# Patient Record
Sex: MALE | Race: WHITE | NOT HISPANIC OR LATINO | Employment: OTHER | URBAN - METROPOLITAN AREA
[De-identification: names, ages, dates, MRNs, and addresses within clinical notes are randomized per-mention and may not be internally consistent; named-entity substitution may affect disease eponyms.]

---

## 2017-06-16 ENCOUNTER — HISTORICAL (OUTPATIENT)
Dept: LAB | Facility: HOSPITAL | Age: 67
End: 2017-06-16

## 2017-06-16 LAB
ABS NEUT (OLG): 4.62
ALBUMIN SERPL-MCNC: 3.2 GM/DL (ref 3.4–5)
ALBUMIN/GLOB SERPL: 0.9 RATIO (ref 1.1–2)
ALP SERPL-CCNC: 79 UNIT/L (ref 50–136)
ALT SERPL-CCNC: 41 UNIT/L (ref 12–78)
APTT PPP: 24.8 SECOND(S) (ref 23–32)
AST SERPL-CCNC: 24 UNIT/L (ref 10–37)
BASOPHILS # BLD AUTO: 0.04 X10(3)/MCL
BASOPHILS NFR BLD AUTO: 0.5 %
BILIRUB SERPL-MCNC: 0.6 MG/DL (ref 0.2–1)
BILIRUBIN DIRECT+TOT PNL SERPL-MCNC: 0.15 MG/DL (ref 0.05–0.2)
BILIRUBIN DIRECT+TOT PNL SERPL-MCNC: 0.45 MG/DL
BUN SERPL-MCNC: 19 MG/DL (ref 7–18)
CALCIUM SERPL-MCNC: 9 MG/DL (ref 8.5–10.1)
CHLORIDE SERPL-SCNC: 107 MMOL/L (ref 98–107)
CHOLEST SERPL-MCNC: 174 MG/DL (ref 50–200)
CHOLEST/HDLC SERPL: 3 {RATIO} (ref 0–5)
CO2 SERPL-SCNC: 26.6 MMOL/L (ref 21–32)
CREAT SERPL-MCNC: 1.02 MG/DL (ref 0.7–1.3)
EOSINOPHIL # BLD AUTO: 0.18 X10(3)/MCL
EOSINOPHIL NFR BLD AUTO: 2.3 %
ERYTHROCYTE [DISTWIDTH] IN BLOOD BY AUTOMATED COUNT: 14 %
GLOBULIN SER-MCNC: 3.5 GM/DL (ref 2.4–3.5)
GLUCOSE SERPL-MCNC: 106 MG/DL (ref 74–106)
HCT VFR BLD AUTO: 44.5 % (ref 39–49)
HDLC SERPL-MCNC: 51 MG/DL (ref 35–60)
HGB BLD-MCNC: 15 GM/DL (ref 12.6–16.6)
IMM GRANULOCYTES # BLD AUTO: 0.01 10*3/UL (ref 0–0.1)
IMM GRANULOCYTES NFR BLD AUTO: 0.1 % (ref 0–1)
INR PPP: 1.04
LDLC SERPL CALC-MCNC: 98 MG/DL (ref 50–140)
LYMPHOCYTES # BLD AUTO: 2.1 X10(3)/MCL
LYMPHOCYTES NFR BLD AUTO: 26.9 %
MCH RBC QN AUTO: 32.5 PG (ref 27–33)
MCHC RBC AUTO-ENTMCNC: 33.7 GM/DL (ref 32–35)
MCV RBC AUTO: 96.3 FL (ref 84–97)
MONOCYTES # BLD AUTO: 0.87 X10(3)/MCL
MONOCYTES NFR BLD AUTO: 11.1 %
NEUTROPHILS # BLD AUTO: 4.62 X10(3)/MCL
NEUTROPHILS NFR BLD AUTO: 59.1 %
PLATELET # BLD AUTO: 224 X10(3)/MCL (ref 151–368)
PMV BLD AUTO: 10 FL
POTASSIUM SERPL-SCNC: 4.4 MMOL/L (ref 3.5–5.1)
PROT SERPL-MCNC: 6.7 GM/DL (ref 6.4–8.2)
PROTHROMBIN TIME: 10.5 SECOND(S) (ref 9–11.5)
PSA SERPL-MCNC: 0.6 NG/ML (ref 0–4)
RBC # BLD AUTO: 4.62 X10(6)/MCL (ref 4.3–5.6)
SODIUM SERPL-SCNC: 141 MMOL/L (ref 136–145)
TRIGL SERPL-MCNC: 127 MG/DL (ref 30–150)
VLDLC SERPL CALC-MCNC: 25 MG/DL
WBC # SPEC AUTO: 7.82 X10(3)/MCL (ref 3.4–9.2)

## 2017-07-20 ENCOUNTER — HISTORICAL (OUTPATIENT)
Dept: LAB | Facility: HOSPITAL | Age: 67
End: 2017-07-20

## 2017-07-20 LAB
ABS NEUT (OLG): 4.84
APPEARANCE, UA: CLEAR
APTT PPP: 25.4 SECOND(S) (ref 23–32)
BACTERIA #/AREA URNS AUTO: NORMAL /HPF
BASOPHILS # BLD AUTO: 0.03 X10(3)/MCL
BASOPHILS NFR BLD AUTO: 0.4 %
BILIRUB UR QL STRIP: NEGATIVE
BUN SERPL-MCNC: 16 MG/DL (ref 7–18)
CALCIUM SERPL-MCNC: 9.3 MG/DL (ref 8.5–10.1)
CHLORIDE SERPL-SCNC: 106 MMOL/L (ref 98–107)
CO2 SERPL-SCNC: 26.8 MMOL/L (ref 21–32)
COLOR UR: YELLOW
CREAT SERPL-MCNC: 0.96 MG/DL (ref 0.7–1.3)
EOSINOPHIL # BLD AUTO: 0.19 X10(3)/MCL
EOSINOPHIL NFR BLD AUTO: 2.5 %
ERYTHROCYTE [DISTWIDTH] IN BLOOD BY AUTOMATED COUNT: 13 %
GLUCOSE (UA): NEGATIVE
GLUCOSE SERPL-MCNC: 104 MG/DL (ref 74–106)
HCT VFR BLD AUTO: 43.4 % (ref 39–49)
HGB BLD-MCNC: 14.9 GM/DL (ref 12.6–16.6)
HGB UR QL STRIP: NEGATIVE
IMM GRANULOCYTES # BLD AUTO: 0.01 10*3/UL (ref 0–0.1)
IMM GRANULOCYTES NFR BLD AUTO: 0.1 % (ref 0–1)
INR PPP: 1.04
KETONES UR QL STRIP: NEGATIVE
LEUKOCYTE ESTERASE UR QL STRIP: NEGATIVE
LYMPHOCYTES # BLD AUTO: 1.97 X10(3)/MCL
LYMPHOCYTES NFR BLD AUTO: 25.6 %
MCH RBC QN AUTO: 32.7 PG (ref 27–33)
MCHC RBC AUTO-ENTMCNC: 34.3 GM/DL (ref 32–35)
MCV RBC AUTO: 95.4 FL (ref 84–97)
MONOCYTES # BLD AUTO: 0.65 X10(3)/MCL
MONOCYTES NFR BLD AUTO: 8.5 %
NEUTROPHILS # BLD AUTO: 4.84 X10(3)/MCL
NEUTROPHILS NFR BLD AUTO: 62.9 %
NITRITE UR QL STRIP.AUTO: NEGATIVE
PH UR STRIP: 6 [PH] (ref 4.6–8)
PLATELET # BLD AUTO: 218 X10(3)/MCL (ref 151–368)
PMV BLD AUTO: 10 FL
POTASSIUM SERPL-SCNC: 3.8 MMOL/L (ref 3.5–5.1)
PROT UR QL STRIP: NEGATIVE
PROTHROMBIN TIME: 10.5 SECOND(S) (ref 9–11.5)
RBC # BLD AUTO: 4.55 X10(6)/MCL (ref 4.3–5.6)
RBC #/AREA URNS HPF: NORMAL /[HPF]
SODIUM SERPL-SCNC: 143 MMOL/L (ref 136–145)
SP GR UR STRIP: 1.02 (ref 1–1.03)
SQUAMOUS EPITHELIAL, UA: NORMAL
UROBILINOGEN UR STRIP-ACNC: 0.2
WBC # SPEC AUTO: 7.69 X10(3)/MCL (ref 3.4–9.2)
WBC #/AREA URNS AUTO: NORMAL /[HPF]

## 2017-09-07 LAB
BUN SERPL-MCNC: 11 MG/DL (ref 7–18)
CALCIUM SERPL-MCNC: 9.2 MG/DL (ref 9–10.9)
CHLORIDE SERPL-SCNC: 104 MMOL/L (ref 98–116)
CO2 SERPL-SCNC: 29 MMOL/L (ref 15–28)
CREAT SERPL-MCNC: 81 MG/DL (ref 0.6–1.3)
GLUCOSE SERPL-MCNC: 85 MG/DL (ref 74–106)
POTASSIUM SERPL-SCNC: 4.6 MMOL/L (ref 3.5–5.1)
SODIUM SERPL-SCNC: 142 MEQ/L (ref 131–145)

## 2018-06-14 ENCOUNTER — HISTORICAL (OUTPATIENT)
Dept: LAB | Facility: HOSPITAL | Age: 68
End: 2018-06-14

## 2018-06-14 LAB
ABS NEUT (OLG): 4.16
ALBUMIN SERPL-MCNC: 3.2 GM/DL (ref 3.4–5)
ALBUMIN/GLOB SERPL: 0.9 RATIO (ref 1.1–2)
ALP SERPL-CCNC: 104 UNIT/L (ref 46–116)
ALT SERPL-CCNC: 49 UNIT/L (ref 12–78)
AST SERPL-CCNC: 63 UNIT/L (ref 10–37)
BASOPHILS # BLD AUTO: 0.02 X10(3)/MCL
BASOPHILS NFR BLD AUTO: 0.2 %
BILIRUB SERPL-MCNC: 0.5 MG/DL (ref 0.2–1)
BILIRUBIN DIRECT+TOT PNL SERPL-MCNC: 0.11 MG/DL (ref 0–0.2)
BILIRUBIN DIRECT+TOT PNL SERPL-MCNC: 0.39 MG/DL
BUN SERPL-MCNC: 17 MG/DL (ref 7–18)
CALCIUM SERPL-MCNC: 9.1 MG/DL (ref 8.5–10.1)
CHLORIDE SERPL-SCNC: 105 MMOL/L (ref 98–107)
CK SERPL-CCNC: 1445 MG/DL (ref 26–308)
CO2 SERPL-SCNC: 26.8 MMOL/L (ref 21–32)
CREAT SERPL-MCNC: 0.97 MG/DL (ref 0.7–1.3)
EOSINOPHIL # BLD AUTO: 0.97 X10(3)/MCL
EOSINOPHIL NFR BLD AUTO: 11.6 %
ERYTHROCYTE [DISTWIDTH] IN BLOOD BY AUTOMATED COUNT: 14 %
GLOBULIN SER-MCNC: 3.6 GM/DL (ref 2.4–3.5)
GLUCOSE SERPL-MCNC: 100 MG/DL (ref 74–106)
HCT VFR BLD AUTO: 43 % (ref 39–49)
HGB BLD-MCNC: 14.8 GM/DL (ref 12.6–16.6)
IMM GRANULOCYTES # BLD AUTO: 0.01 10*3/UL (ref 0–0.1)
IMM GRANULOCYTES NFR BLD AUTO: 0.1 % (ref 0–1)
LYMPHOCYTES # BLD AUTO: 2.46 X10(3)/MCL
LYMPHOCYTES NFR BLD AUTO: 29.4 %
MCH RBC QN AUTO: 33.3 PG (ref 27–33)
MCHC RBC AUTO-ENTMCNC: 34.4 GM/DL (ref 32–35)
MCV RBC AUTO: 96.8 FL (ref 84–97)
MONOCYTES # BLD AUTO: 0.75 X10(3)/MCL
MONOCYTES NFR BLD AUTO: 9 %
NEUTROPHILS # BLD AUTO: 4.16 X10(3)/MCL
NEUTROPHILS NFR BLD AUTO: 49.7 %
PLATELET # BLD AUTO: 220 X10(3)/MCL (ref 151–368)
PMV BLD AUTO: 9 FL
POTASSIUM SERPL-SCNC: 4 MMOL/L (ref 3.5–5.1)
PROT SERPL-MCNC: 6.8 GM/DL (ref 6.4–8.2)
RBC # BLD AUTO: 4.44 X10(6)/MCL (ref 4.3–5.6)
SODIUM SERPL-SCNC: 141 MMOL/L (ref 136–145)
WBC # SPEC AUTO: 8.37 X10(3)/MCL (ref 3.4–9.2)

## 2018-06-19 ENCOUNTER — HISTORICAL (OUTPATIENT)
Dept: LAB | Facility: HOSPITAL | Age: 68
End: 2018-06-19

## 2018-06-19 LAB
ALBUMIN SERPL-MCNC: 3.3 GM/DL (ref 3.4–5)
ALBUMIN/GLOB SERPL: 0.9 RATIO (ref 1.1–2)
ALP SERPL-CCNC: 100 UNIT/L (ref 46–116)
ALT SERPL-CCNC: 38 UNIT/L (ref 12–78)
AST SERPL-CCNC: 19 UNIT/L (ref 10–37)
BILIRUB SERPL-MCNC: 0.4 MG/DL (ref 0.2–1)
BILIRUBIN DIRECT+TOT PNL SERPL-MCNC: 0.1 MG/DL (ref 0–0.2)
BILIRUBIN DIRECT+TOT PNL SERPL-MCNC: 0.3 MG/DL
BUN SERPL-MCNC: 22 MG/DL (ref 7–18)
CALCIUM SERPL-MCNC: 9 MG/DL (ref 8.5–10.1)
CHLORIDE SERPL-SCNC: 106 MMOL/L (ref 98–107)
CK SERPL-CCNC: 118 MG/DL (ref 26–308)
CO2 SERPL-SCNC: 28.7 MMOL/L (ref 21–32)
CREAT SERPL-MCNC: 1.18 MG/DL (ref 0.7–1.3)
GLOBULIN SER-MCNC: 3.7 GM/DL (ref 2.4–3.5)
GLUCOSE SERPL-MCNC: 101 MG/DL (ref 74–106)
POTASSIUM SERPL-SCNC: 4.1 MMOL/L (ref 3.5–5.1)
PROT SERPL-MCNC: 7 GM/DL (ref 6.4–8.2)
SODIUM SERPL-SCNC: 139 MMOL/L (ref 136–145)

## 2019-04-17 ENCOUNTER — HISTORICAL (OUTPATIENT)
Dept: LAB | Facility: HOSPITAL | Age: 69
End: 2019-04-17

## 2019-04-17 LAB
ABS NEUT (OLG): 5.13
ALBUMIN SERPL-MCNC: 3.4 GM/DL (ref 3.4–5)
ALBUMIN/GLOB SERPL: 1 RATIO (ref 1.1–2)
ALP SERPL-CCNC: 96 UNIT/L (ref 46–116)
ALT SERPL-CCNC: 42 UNIT/L (ref 12–78)
AST SERPL-CCNC: 30 UNIT/L (ref 10–37)
BASOPHILS # BLD AUTO: 0.04 X10(3)/MCL
BASOPHILS NFR BLD AUTO: 0.5 %
BILIRUB SERPL-MCNC: 0.6 MG/DL (ref 0.2–1)
BILIRUBIN DIRECT+TOT PNL SERPL-MCNC: 0.15 MG/DL (ref 0–0.2)
BILIRUBIN DIRECT+TOT PNL SERPL-MCNC: 0.45 MG/DL
BUN SERPL-MCNC: 11 MG/DL (ref 7–18)
CALCIUM SERPL-MCNC: 8.9 MG/DL (ref 8.5–10.1)
CHLORIDE SERPL-SCNC: 103 MMOL/L (ref 98–107)
CHOLEST SERPL-MCNC: 154 MG/DL (ref 50–200)
CHOLEST/HDLC SERPL: 4 {RATIO} (ref 0–5)
CO2 SERPL-SCNC: 26 MMOL/L (ref 21–32)
CREAT SERPL-MCNC: 0.95 MG/DL (ref 0.7–1.3)
EOSINOPHIL # BLD AUTO: 0.16 X10(3)/MCL
EOSINOPHIL NFR BLD AUTO: 2 %
ERYTHROCYTE [DISTWIDTH] IN BLOOD BY AUTOMATED COUNT: 14 %
GLOBULIN SER-MCNC: 3.5 GM/DL (ref 2.4–3.5)
GLUCOSE SERPL-MCNC: 117 MG/DL (ref 74–106)
HCT VFR BLD AUTO: 44.8 % (ref 39–49)
HDLC SERPL-MCNC: 42 MG/DL (ref 35–60)
HGB BLD-MCNC: 14.8 GM/DL (ref 12.6–16.6)
IMM GRANULOCYTES # BLD AUTO: 0.01 10*3/UL (ref 0–0.1)
IMM GRANULOCYTES NFR BLD AUTO: 0.1 % (ref 0–1)
LDLC SERPL CALC-MCNC: 90 MG/DL (ref 50–140)
LYMPHOCYTES # BLD AUTO: 2.06 X10(3)/MCL
LYMPHOCYTES NFR BLD AUTO: 25.3 %
MCH RBC QN AUTO: 32.5 PG (ref 27–33)
MCHC RBC AUTO-ENTMCNC: 33 GM/DL (ref 32–35)
MCV RBC AUTO: 98.5 FL (ref 84–97)
MONOCYTES # BLD AUTO: 0.75 X10(3)/MCL
MONOCYTES NFR BLD AUTO: 9.2 %
NEUTROPHILS # BLD AUTO: 5.13 X10(3)/MCL
NEUTROPHILS NFR BLD AUTO: 62.9 %
PLATELET # BLD AUTO: 237 X10(3)/MCL (ref 151–368)
PMV BLD AUTO: 9 FL
POTASSIUM SERPL-SCNC: 3.9 MMOL/L (ref 3.5–5.1)
PROT SERPL-MCNC: 6.9 GM/DL (ref 6.4–8.2)
PSA SERPL-MCNC: 1.37 NG/ML (ref 0–4)
RBC # BLD AUTO: 4.55 X10(6)/MCL (ref 4.3–5.6)
SODIUM SERPL-SCNC: 137 MMOL/L (ref 136–145)
TRIGL SERPL-MCNC: 112 MG/DL (ref 30–150)
VLDLC SERPL CALC-MCNC: 22 MG/DL
WBC # SPEC AUTO: 8.15 X10(3)/MCL (ref 3.4–9.2)

## 2019-06-05 ENCOUNTER — HISTORICAL (OUTPATIENT)
Dept: LAB | Facility: HOSPITAL | Age: 69
End: 2019-06-05

## 2019-06-05 LAB
ABS NEUT (OLG): 4.47
ALBUMIN SERPL-MCNC: 3.5 GM/DL (ref 3.4–5)
ALBUMIN/GLOB SERPL: 0.9 RATIO (ref 1.1–2)
ALP SERPL-CCNC: 101 UNIT/L (ref 46–116)
ALT SERPL-CCNC: 58 UNIT/L (ref 12–78)
AST SERPL-CCNC: 21 UNIT/L (ref 10–37)
BASOPHILS # BLD AUTO: 0.04 X10(3)/MCL
BASOPHILS NFR BLD AUTO: 0.5 %
BILIRUB SERPL-MCNC: 0.4 MG/DL (ref 0.2–1)
BILIRUBIN DIRECT+TOT PNL SERPL-MCNC: 0.1 MG/DL (ref 0–0.2)
BILIRUBIN DIRECT+TOT PNL SERPL-MCNC: 0.3 MG/DL
BUN SERPL-MCNC: 14 MG/DL (ref 7–18)
CALCIUM SERPL-MCNC: 9.1 MG/DL (ref 8.5–10.1)
CHLORIDE SERPL-SCNC: 108 MMOL/L (ref 98–107)
CK SERPL-CCNC: 104 MG/DL (ref 26–308)
CO2 SERPL-SCNC: 27.3 MMOL/L (ref 21–32)
CREAT SERPL-MCNC: 1.02 MG/DL (ref 0.7–1.3)
EOSINOPHIL # BLD AUTO: 0.11 X10(3)/MCL
EOSINOPHIL NFR BLD AUTO: 1.3 %
ERYTHROCYTE [DISTWIDTH] IN BLOOD BY AUTOMATED COUNT: 14 %
GLOBULIN SER-MCNC: 3.7 GM/DL (ref 2.4–3.5)
GLUCOSE SERPL-MCNC: 97 MG/DL (ref 74–106)
HCT VFR BLD AUTO: 44.8 % (ref 39–49)
HGB BLD-MCNC: 15.1 GM/DL (ref 12.6–16.6)
IMM GRANULOCYTES # BLD AUTO: 0.03 10*3/UL (ref 0–0.1)
IMM GRANULOCYTES NFR BLD AUTO: 0.4 % (ref 0–1)
LYMPHOCYTES # BLD AUTO: 2.68 X10(3)/MCL
LYMPHOCYTES NFR BLD AUTO: 32.8 %
MCH RBC QN AUTO: 32.7 PG (ref 27–33)
MCHC RBC AUTO-ENTMCNC: 33.7 GM/DL (ref 32–35)
MCV RBC AUTO: 97 FL (ref 84–97)
MONOCYTES # BLD AUTO: 0.84 X10(3)/MCL
MONOCYTES NFR BLD AUTO: 10.3 %
NEUTROPHILS # BLD AUTO: 4.47 X10(3)/MCL
NEUTROPHILS NFR BLD AUTO: 54.7 %
PLATELET # BLD AUTO: 253 X10(3)/MCL (ref 151–368)
PMV BLD AUTO: 9 FL
POTASSIUM SERPL-SCNC: 4 MMOL/L (ref 3.5–5.1)
PROT SERPL-MCNC: 7.2 GM/DL (ref 6.4–8.2)
RBC # BLD AUTO: 4.62 X10(6)/MCL (ref 4.3–5.6)
SODIUM SERPL-SCNC: 144 MMOL/L (ref 136–145)
WBC # SPEC AUTO: 8.17 X10(3)/MCL (ref 3.4–9.2)

## 2020-02-07 ENCOUNTER — HISTORICAL (OUTPATIENT)
Dept: LAB | Facility: HOSPITAL | Age: 70
End: 2020-02-07

## 2020-02-07 LAB
ABS NEUT (OLG): 4.04
ALBUMIN SERPL-MCNC: 3.6 GM/DL (ref 3.2–4.6)
ALBUMIN/GLOB SERPL: 1.1 RATIO (ref 1.1–2)
ALP SERPL-CCNC: 93 UNIT/L (ref 40–150)
ALT SERPL-CCNC: 31 UNIT/L (ref 0–55)
AST SERPL-CCNC: 20 UNIT/L (ref 5–34)
BASOPHILS # BLD AUTO: 0.04 X10(3)/MCL
BASOPHILS NFR BLD AUTO: 0.6 %
BILIRUB SERPL-MCNC: 0.5 MG/DL
BILIRUBIN DIRECT+TOT PNL SERPL-MCNC: 0.2 MG/DL (ref 0–0.5)
BILIRUBIN DIRECT+TOT PNL SERPL-MCNC: 0.3 MG/DL
BUN SERPL-MCNC: 16 MG/DL (ref 8.4–25.7)
CALCIUM SERPL-MCNC: 9.5 MG/DL (ref 8.8–10)
CHLORIDE SERPL-SCNC: 108 MMOL/L (ref 98–107)
CK SERPL-CCNC: 119 U/L (ref 30–200)
CO2 SERPL-SCNC: 25 MEQ/L (ref 23–31)
CREAT SERPL-MCNC: 1.05 MG/DL (ref 0.73–1.18)
EOSINOPHIL # BLD AUTO: 0.22 X10(3)/MCL
EOSINOPHIL NFR BLD AUTO: 3.1 %
ERYTHROCYTE [DISTWIDTH] IN BLOOD BY AUTOMATED COUNT: 14 %
GLOBULIN SER-MCNC: 3.3 GM/DL (ref 2.4–3.5)
GLUCOSE SERPL-MCNC: 124 MG/DL (ref 82–115)
HCT VFR BLD AUTO: 46.9 % (ref 39–49)
HGB BLD-MCNC: 15.1 GM/DL (ref 12.6–16.6)
IMM GRANULOCYTES # BLD AUTO: 0.01 10*3/UL (ref 0–0.1)
IMM GRANULOCYTES NFR BLD AUTO: 0.1 % (ref 0–1)
LYMPHOCYTES # BLD AUTO: 2.01 X10(3)/MCL
LYMPHOCYTES NFR BLD AUTO: 28.6 %
MCH RBC QN AUTO: 32.1 PG (ref 27–33)
MCHC RBC AUTO-ENTMCNC: 32.2 GM/DL (ref 32–35)
MCV RBC AUTO: 99.8 FL (ref 84–97)
MONOCYTES # BLD AUTO: 0.71 X10(3)/MCL
MONOCYTES NFR BLD AUTO: 10.1 %
NEUTROPHILS # BLD AUTO: 4.04 X10(3)/MCL
NEUTROPHILS NFR BLD AUTO: 57.5 %
PLATELET # BLD AUTO: 277 X10(3)/MCL (ref 140–450)
PMV BLD AUTO: 10 FL
POTASSIUM SERPL-SCNC: 3.9 MMOL/L (ref 3.5–5.1)
PROT SERPL-MCNC: 6.9 GM/DL (ref 5.8–7.6)
RBC # BLD AUTO: 4.7 X10(6)/MCL (ref 4.3–5.6)
SODIUM SERPL-SCNC: 143 MMOL/L (ref 136–145)
WBC # SPEC AUTO: 7.03 X10(3)/MCL (ref 3.4–9.2)

## 2020-07-01 ENCOUNTER — HISTORICAL (OUTPATIENT)
Dept: RADIOLOGY | Facility: HOSPITAL | Age: 70
End: 2020-07-01

## 2020-07-01 ENCOUNTER — HISTORICAL (OUTPATIENT)
Dept: ADMINISTRATIVE | Facility: HOSPITAL | Age: 70
End: 2020-07-01

## 2020-07-01 LAB — PSA SERPL-MCNC: 0.58 NG/ML

## 2020-07-21 ENCOUNTER — HISTORICAL (OUTPATIENT)
Dept: LAB | Facility: HOSPITAL | Age: 70
End: 2020-07-21

## 2020-07-21 LAB
ABS NEUT (OLG): 4.88
ALBUMIN SERPL-MCNC: 3.7 GM/DL (ref 3.4–4.8)
ALBUMIN/GLOB SERPL: 1 RATIO (ref 1.1–2)
ALP SERPL-CCNC: 104 UNIT/L (ref 40–150)
ALT SERPL-CCNC: 29 UNIT/L (ref 0–55)
AST SERPL-CCNC: 24 UNIT/L (ref 5–34)
BASOPHILS # BLD AUTO: 0.02 X10(3)/MCL
BASOPHILS NFR BLD AUTO: 0.3 %
BILIRUB SERPL-MCNC: 0.4 MG/DL
BILIRUBIN DIRECT+TOT PNL SERPL-MCNC: 0.1 MG/DL (ref 0–0.5)
BILIRUBIN DIRECT+TOT PNL SERPL-MCNC: 0.3 MG/DL
BUN SERPL-MCNC: 17 MG/DL (ref 8.4–25.7)
CALCIUM SERPL-MCNC: 9.4 MG/DL (ref 8.8–10)
CHLORIDE SERPL-SCNC: 109 MMOL/L (ref 98–107)
CK SERPL-CCNC: 154 U/L (ref 30–200)
CO2 SERPL-SCNC: 23 MEQ/L (ref 23–31)
CREAT SERPL-MCNC: 0.96 MG/DL (ref 0.73–1.18)
CRP SERPL-MCNC: <0.3 MG/DL
EOSINOPHIL # BLD AUTO: 0.04 X10(3)/MCL
EOSINOPHIL NFR BLD AUTO: 0.6 %
ERYTHROCYTE [DISTWIDTH] IN BLOOD BY AUTOMATED COUNT: 13 %
ERYTHROCYTE [SEDIMENTATION RATE] IN BLOOD: 22 MM/HR (ref 0–20)
GLOBULIN SER-MCNC: 3.7 GM/DL (ref 2.4–3.5)
GLUCOSE SERPL-MCNC: 155 MG/DL (ref 82–115)
HCT VFR BLD AUTO: 41.7 % (ref 39–49)
HGB BLD-MCNC: 14.6 GM/DL (ref 12.6–16.6)
IMM GRANULOCYTES # BLD AUTO: 0.1 10*3/UL (ref 0–0.1)
IMM GRANULOCYTES NFR BLD AUTO: 1.4 % (ref 0–1)
LYMPHOCYTES # BLD AUTO: 1.64 X10(3)/MCL
LYMPHOCYTES NFR BLD AUTO: 22.7 %
MCH RBC QN AUTO: 34.3 PG (ref 27–33)
MCHC RBC AUTO-ENTMCNC: 35 GM/DL (ref 32–35)
MCV RBC AUTO: 97.9 FL (ref 84–97)
MONOCYTES # BLD AUTO: 0.54 X10(3)/MCL
MONOCYTES NFR BLD AUTO: 7.5 %
NEUTROPHILS # BLD AUTO: 4.88 X10(3)/MCL
NEUTROPHILS NFR BLD AUTO: 67.5 %
PLATELET # BLD AUTO: 240 X10(3)/MCL (ref 140–450)
PMV BLD AUTO: 10 FL
POTASSIUM SERPL-SCNC: 4.5 MMOL/L (ref 3.5–5.1)
PROT SERPL-MCNC: 7.4 GM/DL (ref 5.8–7.6)
RBC # BLD AUTO: 4.26 X10(6)/MCL (ref 4.3–5.6)
SODIUM SERPL-SCNC: 143 MMOL/L (ref 136–145)
WBC # SPEC AUTO: 7.22 X10(3)/MCL (ref 3.4–9.2)

## 2020-08-28 ENCOUNTER — HISTORICAL (OUTPATIENT)
Dept: LAB | Facility: HOSPITAL | Age: 70
End: 2020-08-28

## 2020-08-28 LAB
CHOLEST SERPL-MCNC: 142 MG/DL
CHOLEST/HDLC SERPL: 4 {RATIO} (ref 0–5)
HDLC SERPL-MCNC: 38 MG/DL (ref 35–60)
LDLC SERPL CALC-MCNC: 81 MG/DL (ref 50–140)
PSA SERPL-MCNC: 0.41 NG/ML
TRIGL SERPL-MCNC: 115 MG/DL (ref 34–140)
VLDLC SERPL CALC-MCNC: 23 MG/DL

## 2021-03-17 ENCOUNTER — HISTORICAL (OUTPATIENT)
Dept: RADIOLOGY | Facility: HOSPITAL | Age: 71
End: 2021-03-17

## 2021-06-10 ENCOUNTER — HISTORICAL (OUTPATIENT)
Dept: RADIOLOGY | Facility: HOSPITAL | Age: 71
End: 2021-06-10

## 2021-09-23 ENCOUNTER — HISTORICAL (OUTPATIENT)
Dept: LAB | Facility: HOSPITAL | Age: 71
End: 2021-09-23

## 2021-09-23 LAB
ABS NEUT (OLG): 3.37
ALBUMIN SERPL-MCNC: 3.6 GM/DL (ref 3.4–4.8)
ALBUMIN/GLOB SERPL: 1 RATIO (ref 1.1–2)
ALP SERPL-CCNC: 193 UNIT/L (ref 40–150)
ALT SERPL-CCNC: 644 UNIT/L (ref 0–55)
AST SERPL-CCNC: 468 UNIT/L (ref 5–34)
BASOPHILS # BLD AUTO: 0.03 X10(3)/MCL
BASOPHILS NFR BLD AUTO: 0.5 %
BILIRUB SERPL-MCNC: 2.9 MG/DL
BILIRUBIN DIRECT+TOT PNL SERPL-MCNC: 0.7 MG/DL
BILIRUBIN DIRECT+TOT PNL SERPL-MCNC: 2.2 MG/DL (ref 0–0.5)
BUN SERPL-MCNC: 10 MG/DL (ref 8.4–25.7)
CALCIUM SERPL-MCNC: 9.7 MG/DL (ref 8.8–10)
CHLORIDE SERPL-SCNC: 107 MMOL/L (ref 98–107)
CHOLEST SERPL-MCNC: 153 MG/DL
CHOLEST/HDLC SERPL: 5 {RATIO} (ref 0–5)
CO2 SERPL-SCNC: 25 MEQ/L (ref 23–31)
CREAT SERPL-MCNC: 0.88 MG/DL (ref 0.73–1.18)
EOSINOPHIL # BLD AUTO: 0.18 X10(3)/MCL
EOSINOPHIL NFR BLD AUTO: 3.3 %
ERYTHROCYTE [DISTWIDTH] IN BLOOD BY AUTOMATED COUNT: 14 %
GLOBULIN SER-MCNC: 3.5 GM/DL (ref 2.4–3.5)
GLUCOSE SERPL-MCNC: 154 MG/DL (ref 82–115)
HCT VFR BLD AUTO: 47.3 % (ref 39–49)
HDLC SERPL-MCNC: 33 MG/DL (ref 35–60)
HGB BLD-MCNC: 15.8 GM/DL (ref 12.6–16.6)
IMM GRANULOCYTES # BLD AUTO: 0.01 10*3/UL (ref 0–0.1)
IMM GRANULOCYTES NFR BLD AUTO: 0.2 % (ref 0–1)
LDLC SERPL CALC-MCNC: 75 MG/DL (ref 50–140)
LYMPHOCYTES # BLD AUTO: 1.33 X10(3)/MCL
LYMPHOCYTES NFR BLD AUTO: 24.1 %
MCH RBC QN AUTO: 32.8 PG (ref 27–33)
MCHC RBC AUTO-ENTMCNC: 33.4 GM/DL (ref 32–35)
MCV RBC AUTO: 98.3 FL (ref 84–97)
MONOCYTES # BLD AUTO: 0.6 X10(3)/MCL
MONOCYTES NFR BLD AUTO: 10.9 %
NEUTROPHILS # BLD AUTO: 3.37 X10(3)/MCL
NEUTROPHILS NFR BLD AUTO: 61 %
PLATELET # BLD AUTO: 225 X10(3)/MCL (ref 140–450)
PMV BLD AUTO: 10 FL
POTASSIUM SERPL-SCNC: 3.9 MMOL/L (ref 3.5–5.1)
PROT SERPL-MCNC: 7.1 GM/DL (ref 5.8–7.6)
PSA SERPL-MCNC: 0.51 NG/ML
RBC # BLD AUTO: 4.81 X10(6)/MCL (ref 4.3–5.6)
SODIUM SERPL-SCNC: 141 MMOL/L (ref 136–145)
TRIGL SERPL-MCNC: 223 MG/DL (ref 34–140)
VLDLC SERPL CALC-MCNC: 45 MG/DL
WBC # SPEC AUTO: 5.52 X10(3)/MCL (ref 3.4–9.2)

## 2021-09-24 ENCOUNTER — HISTORICAL (OUTPATIENT)
Dept: RADIOLOGY | Facility: HOSPITAL | Age: 71
End: 2021-09-24

## 2021-09-29 ENCOUNTER — HISTORICAL (OUTPATIENT)
Dept: LAB | Facility: HOSPITAL | Age: 71
End: 2021-09-29

## 2021-09-29 LAB
ABS NEUT (OLG): 4.23
ALBUMIN SERPL-MCNC: 3.6 GM/DL (ref 3.4–4.8)
ALBUMIN/GLOB SERPL: 1 RATIO (ref 1.1–2)
ALP SERPL-CCNC: 124 UNIT/L (ref 40–150)
ALT SERPL-CCNC: 114 UNIT/L (ref 0–55)
AST SERPL-CCNC: 40 UNIT/L (ref 5–34)
BASOPHILS # BLD AUTO: 0.02 X10(3)/MCL
BASOPHILS NFR BLD AUTO: 0.3 %
BILIRUB SERPL-MCNC: 0.7 MG/DL
BILIRUBIN DIRECT+TOT PNL SERPL-MCNC: 0.3 MG/DL (ref 0–0.5)
BILIRUBIN DIRECT+TOT PNL SERPL-MCNC: 0.4 MG/DL
BUN SERPL-MCNC: 15 MG/DL (ref 8.4–25.7)
CALCIUM SERPL-MCNC: 9.5 MG/DL (ref 8.8–10)
CHLORIDE SERPL-SCNC: 108 MMOL/L (ref 98–107)
CO2 SERPL-SCNC: 26 MEQ/L (ref 23–31)
CREAT SERPL-MCNC: 0.98 MG/DL (ref 0.73–1.18)
CRP SERPL-MCNC: 0.29 MG/DL
EOSINOPHIL # BLD AUTO: 0.11 X10(3)/MCL
EOSINOPHIL NFR BLD AUTO: 1.6 %
ERYTHROCYTE [DISTWIDTH] IN BLOOD BY AUTOMATED COUNT: 14 %
ERYTHROCYTE [SEDIMENTATION RATE] IN BLOOD: 6 MM/HR (ref 0–20)
EST. AVERAGE GLUCOSE BLD GHB EST-MCNC: 120 MG/DL
GGT SERPL-CCNC: 505 U/L (ref 12–64)
GLOBULIN SER-MCNC: 3.5 GM/DL (ref 2.4–3.5)
GLUCOSE SERPL-MCNC: 119 MG/DL (ref 82–115)
HAV IGM SERPL QL IA: NONREACTIVE
HBA1C MFR BLD: 5.8 % (ref 4–6)
HBV CORE IGM SERPL QL IA: NONREACTIVE
HBV SURFACE AG SERPL QL IA: NONREACTIVE
HCT VFR BLD AUTO: 45.8 % (ref 39–49)
HCV AB SERPL QL IA: NONREACTIVE
HEPATITIS PANEL INTERP: NORMAL
HGB BLD-MCNC: 15.5 GM/DL (ref 12.6–16.6)
IMM GRANULOCYTES # BLD AUTO: 0.02 10*3/UL (ref 0–0.1)
IMM GRANULOCYTES NFR BLD AUTO: 0.3 % (ref 0–1)
LYMPHOCYTES # BLD AUTO: 1.9 X10(3)/MCL
LYMPHOCYTES NFR BLD AUTO: 27.2 %
MCH RBC QN AUTO: 33.5 PG (ref 27–33)
MCHC RBC AUTO-ENTMCNC: 33.8 GM/DL (ref 32–35)
MCV RBC AUTO: 99.1 FL (ref 84–97)
MONOCYTES # BLD AUTO: 0.7 X10(3)/MCL
MONOCYTES NFR BLD AUTO: 10 %
NEUTROPHILS # BLD AUTO: 4.23 X10(3)/MCL
NEUTROPHILS NFR BLD AUTO: 60.6 %
PLATELET # BLD AUTO: 224 X10(3)/MCL (ref 140–450)
PMV BLD AUTO: 10 FL
POTASSIUM SERPL-SCNC: 4.4 MMOL/L (ref 3.5–5.1)
PROT SERPL-MCNC: 7.1 GM/DL (ref 5.8–7.6)
RBC # BLD AUTO: 4.62 X10(6)/MCL (ref 4.3–5.6)
SODIUM SERPL-SCNC: 142 MMOL/L (ref 136–145)
WBC # SPEC AUTO: 6.98 X10(3)/MCL (ref 3.4–9.2)

## 2021-11-24 ENCOUNTER — HISTORICAL (OUTPATIENT)
Dept: LAB | Facility: HOSPITAL | Age: 71
End: 2021-11-24

## 2021-11-24 LAB
ABS NEUT (OLG): 4.34
ALBUMIN SERPL-MCNC: 3.7 GM/DL (ref 3.4–4.8)
ALBUMIN/GLOB SERPL: 1.1 RATIO (ref 1.1–2)
ALP SERPL-CCNC: 88 UNIT/L (ref 40–150)
ALT SERPL-CCNC: 26 UNIT/L (ref 0–55)
AST SERPL-CCNC: 20 UNIT/L (ref 5–34)
BASOPHILS # BLD AUTO: 0.03 X10(3)/MCL
BASOPHILS NFR BLD AUTO: 0.4 %
BILIRUB SERPL-MCNC: 0.7 MG/DL
BILIRUBIN DIRECT+TOT PNL SERPL-MCNC: 0.3 MG/DL (ref 0–0.5)
BILIRUBIN DIRECT+TOT PNL SERPL-MCNC: 0.4 MG/DL
BUN SERPL-MCNC: 12 MG/DL (ref 8.4–25.7)
CALCIUM SERPL-MCNC: 9.6 MG/DL (ref 8.7–10.5)
CHLORIDE SERPL-SCNC: 105 MMOL/L (ref 98–107)
CK SERPL-CCNC: 89 U/L (ref 30–200)
CO2 SERPL-SCNC: 28 MEQ/L (ref 23–31)
CREAT SERPL-MCNC: 0.84 MG/DL (ref 0.73–1.18)
CRP SERPL-MCNC: 0.2 MG/DL
EOSINOPHIL # BLD AUTO: 0.13 X10(3)/MCL
EOSINOPHIL NFR BLD AUTO: 1.8 %
ERYTHROCYTE [DISTWIDTH] IN BLOOD BY AUTOMATED COUNT: 13 %
ERYTHROCYTE [SEDIMENTATION RATE] IN BLOOD: 3 MM/HR (ref 0–20)
GLOBULIN SER-MCNC: 3.5 GM/DL (ref 2.4–3.5)
GLUCOSE SERPL-MCNC: 118 MG/DL (ref 82–115)
HCT VFR BLD AUTO: 47.6 % (ref 39–49)
HGB BLD-MCNC: 15.9 GM/DL (ref 12.6–16.6)
IMM GRANULOCYTES # BLD AUTO: 0.01 10*3/UL (ref 0–0.1)
IMM GRANULOCYTES NFR BLD AUTO: 0.1 % (ref 0–1)
LYMPHOCYTES # BLD AUTO: 1.82 X10(3)/MCL
LYMPHOCYTES NFR BLD AUTO: 25.9 %
MCH RBC QN AUTO: 32.4 PG (ref 27–33)
MCHC RBC AUTO-ENTMCNC: 33.4 GM/DL (ref 32–35)
MCV RBC AUTO: 96.9 FL (ref 84–97)
MONOCYTES # BLD AUTO: 0.71 X10(3)/MCL
MONOCYTES NFR BLD AUTO: 10.1 %
NEUTROPHILS # BLD AUTO: 4.34 X10(3)/MCL
NEUTROPHILS NFR BLD AUTO: 61.7 %
PLATELET # BLD AUTO: 228 X10(3)/MCL (ref 140–450)
PMV BLD AUTO: 9 FL
POTASSIUM SERPL-SCNC: 4 MMOL/L (ref 3.5–5.1)
PROT SERPL-MCNC: 7.2 GM/DL (ref 5.8–7.6)
RBC # BLD AUTO: 4.91 X10(6)/MCL (ref 4.3–5.6)
SODIUM SERPL-SCNC: 141 MMOL/L (ref 136–145)
WBC # SPEC AUTO: 7.04 X10(3)/MCL (ref 3.4–9.2)

## 2022-01-31 ENCOUNTER — HISTORICAL (OUTPATIENT)
Dept: ADMINISTRATIVE | Facility: HOSPITAL | Age: 72
End: 2022-01-31

## 2022-01-31 ENCOUNTER — HISTORICAL (OUTPATIENT)
Dept: RADIOLOGY | Facility: HOSPITAL | Age: 72
End: 2022-01-31

## 2022-03-08 ENCOUNTER — HISTORICAL (OUTPATIENT)
Dept: LAB | Facility: HOSPITAL | Age: 72
End: 2022-03-08

## 2022-03-08 LAB
ABS NEUT (OLG): 4.23
ALBUMIN SERPL-MCNC: 3.8 G/DL (ref 3.4–4.8)
ALBUMIN/GLOB SERPL: 1.1 {RATIO} (ref 1.1–2)
ALP SERPL-CCNC: 89 U/L (ref 40–150)
ALT SERPL-CCNC: 29 U/L (ref 0–55)
AST SERPL-CCNC: 20 U/L (ref 5–34)
BASOPHILS # BLD AUTO: 0.04 10*3/UL
BASOPHILS NFR BLD AUTO: 0.5 %
BILIRUB SERPL-MCNC: 0.7 MG/DL
BILIRUBIN DIRECT+TOT PNL SERPL-MCNC: 0.2 (ref 0–0.5)
BILIRUBIN DIRECT+TOT PNL SERPL-MCNC: 0.5
BUN SERPL-MCNC: 13 MG/DL (ref 8.4–25.7)
CALCIUM SERPL-MCNC: 9.6 MG/DL (ref 8.7–10.5)
CHLORIDE SERPL-SCNC: 107 MMOL/L (ref 98–107)
CK SERPL-CCNC: 144 U/L (ref 30–200)
CO2 SERPL-SCNC: 26 MMOL/L (ref 23–31)
CREAT SERPL-MCNC: 0.91 MG/DL (ref 0.73–1.18)
CRP SERPL-MCNC: 0.24 MG/L
EOSINOPHIL # BLD AUTO: 0.13 10*3/UL
EOSINOPHIL NFR BLD AUTO: 1.8 %
ERYTHROCYTE [DISTWIDTH] IN BLOOD BY AUTOMATED COUNT: 14 %
ERYTHROCYTE [SEDIMENTATION RATE] IN BLOOD: 11 MM/H (ref 0–20)
GLOBULIN SER-MCNC: 3.4 G/DL (ref 2.4–3.5)
GLUCOSE SERPL-MCNC: 119 MG/DL (ref 82–115)
HCT VFR BLD AUTO: 41.3 % (ref 39–49)
HEMOLYSIS INTERF INDEX SERPL-ACNC: 3
HGB BLD-MCNC: 14.3 G/DL (ref 12.6–16.6)
ICTERIC INTERF INDEX SERPL-ACNC: 1
IMM GRANULOCYTES # BLD AUTO: 0.01 10*3/UL (ref 0–0.1)
IMM GRANULOCYTES NFR BLD AUTO: 0.1 % (ref 0–1)
LIPEMIC INTERF INDEX SERPL-ACNC: -2
LYMPHOCYTES # BLD AUTO: 2.27 10*3/UL
LYMPHOCYTES NFR BLD AUTO: 31.1 %
MANUAL DIFF? (OHS): NO
MCH RBC QN AUTO: 33.7 PG (ref 27–33)
MCHC RBC AUTO-ENTMCNC: 34.6 G/DL (ref 32–35)
MCV RBC AUTO: 97.4 FL (ref 84–97)
MONOCYTES # BLD AUTO: 0.63 10*3/UL
MONOCYTES NFR BLD AUTO: 8.6 %
NEUTROPHILS # BLD AUTO: 4.23 10*3/UL
NEUTROPHILS NFR BLD AUTO: 57.9 %
PLATELET # BLD AUTO: 228 10*3/UL (ref 140–450)
PMV BLD AUTO: 10 FL
POTASSIUM SERPL-SCNC: 4.1 MMOL/L (ref 3.5–5.1)
PROT SERPL-MCNC: 7.2 G/DL (ref 5.8–7.6)
RBC # BLD AUTO: 4.24 10*6/UL (ref 4.3–5.6)
SODIUM SERPL-SCNC: 141 MMOL/L (ref 136–145)
WBC # SPEC AUTO: 7.31 10*3/UL (ref 3.4–9.2)

## 2022-04-07 ENCOUNTER — HISTORICAL (OUTPATIENT)
Dept: ADMINISTRATIVE | Facility: HOSPITAL | Age: 72
End: 2022-04-07
Payer: MEDICARE

## 2022-04-24 VITALS
WEIGHT: 235.88 LBS | BODY MASS INDEX: 33.02 KG/M2 | HEIGHT: 71 IN | SYSTOLIC BLOOD PRESSURE: 138 MMHG | DIASTOLIC BLOOD PRESSURE: 88 MMHG

## 2022-05-17 ENCOUNTER — OFFICE VISIT (OUTPATIENT)
Dept: FAMILY MEDICINE | Facility: CLINIC | Age: 72
End: 2022-05-17
Payer: MEDICARE

## 2022-05-17 VITALS
HEIGHT: 71 IN | DIASTOLIC BLOOD PRESSURE: 66 MMHG | BODY MASS INDEX: 32.85 KG/M2 | HEART RATE: 73 BPM | RESPIRATION RATE: 18 BRPM | TEMPERATURE: 97 F | OXYGEN SATURATION: 97 % | WEIGHT: 234.63 LBS | SYSTOLIC BLOOD PRESSURE: 138 MMHG

## 2022-05-17 DIAGNOSIS — G89.29 CHRONIC PAIN OF LEFT KNEE: Primary | ICD-10-CM

## 2022-05-17 DIAGNOSIS — I50.9 HEART FAILURE, UNSPECIFIED HF CHRONICITY, UNSPECIFIED HEART FAILURE TYPE: ICD-10-CM

## 2022-05-17 DIAGNOSIS — M25.562 CHRONIC PAIN OF LEFT KNEE: Primary | ICD-10-CM

## 2022-05-17 DIAGNOSIS — E66.01 MORBID (SEVERE) OBESITY DUE TO EXCESS CALORIES: ICD-10-CM

## 2022-05-17 PROBLEM — E05.90 HYPERTHYROIDISM: Status: ACTIVE | Noted: 2022-05-17

## 2022-05-17 PROBLEM — I10 HYPERTENSION: Status: ACTIVE | Noted: 2022-05-17

## 2022-05-17 PROBLEM — I25.10 ARTERIOSCLEROSIS OF CORONARY ARTERY: Status: ACTIVE | Noted: 2022-05-17

## 2022-05-17 PROBLEM — G47.33 OBSTRUCTIVE SLEEP APNEA SYNDROME: Status: ACTIVE | Noted: 2022-05-17

## 2022-05-17 PROCEDURE — 99214 PR OFFICE/OUTPT VISIT, EST, LEVL IV, 30-39 MIN: ICD-10-PCS | Mod: ,,, | Performed by: FAMILY MEDICINE

## 2022-05-17 PROCEDURE — 99214 OFFICE O/P EST MOD 30 MIN: CPT | Mod: ,,, | Performed by: FAMILY MEDICINE

## 2022-05-17 RX ORDER — DICLOFENAC SODIUM 10 MG/G
2 GEL TOPICAL 2 TIMES DAILY PRN
COMMUNITY
Start: 2022-01-31

## 2022-05-17 RX ORDER — MELOXICAM 7.5 MG/1
7.5 TABLET ORAL DAILY
Qty: 30 TABLET | Refills: 1 | Status: SHIPPED | OUTPATIENT
Start: 2022-05-17 | End: 2022-06-16

## 2022-05-17 RX ORDER — AMLODIPINE BESYLATE 5 MG/1
5 TABLET ORAL DAILY
COMMUNITY
Start: 2021-11-22

## 2022-05-17 RX ORDER — VALSARTAN 320 MG/1
320 TABLET ORAL
COMMUNITY
Start: 2021-11-22

## 2022-05-17 NOTE — PROGRESS NOTES
"Subjective:       Patient ID: Jose De Jesus Oropeza is a 71 y.o. male.    Chief Complaint: left knee pain gradually worsening x 2-3 months "burning"      Left knee pain      Review of Systems   Constitutional: Negative.    HENT: Negative.    Respiratory: Negative.    Cardiovascular: Negative.         CHF: followed by [facility] with Dr Salcedo   Musculoskeletal: Negative.         Left knee pain: present x 3 months, reports climbing out of crawfish boat daily, reports that knee occ "gets stuck", swelling, no help with OTC medication           Objective:      /66 (BP Location: Left arm, Patient Position: Sitting, BP Method: Large (Manual))   Pulse 73   Temp 96.9 °F (36.1 °C)   Resp 18   Ht 5' 11" (1.803 m)   Wt 106.4 kg (234 lb 9.6 oz)   SpO2 97%   BMI 32.72 kg/m²    Physical Exam  Constitutional:       General: He is not in acute distress.     Appearance: Normal appearance.   Cardiovascular:      Rate and Rhythm: Normal rate and regular rhythm.   Pulmonary:      Effort: Pulmonary effort is normal.      Breath sounds: Normal breath sounds.   Musculoskeletal:      Left knee: Swelling and crepitus present. Decreased range of motion (due to pain). Tenderness present over the medial joint line and lateral joint line.   Neurological:      Mental Status: He is alert.   Psychiatric:         Mood and Affect: Mood normal.         Behavior: Behavior normal.         Thought Content: Thought content normal.         Judgment: Judgment normal.           XR Knee Left 1 or 2 Views     HISTORY: lt knee pain     COMPARISON: None.     FINDINGS:     No acute displaced fractures or dislocations.  There is some narrowing of the medial compartment of the knee joint  articular spaces are otherwise preserved with smooth articular  surfaces  No blastic or lytic lesions.  Soft tissues within normal limits.     IMPRESSION:     Degenerative changes.  Electronically Signed By: Paul Gamez MD  Date/Time Signed: 01/31/2022 " 14:59    Assessment:       Problem List Items Addressed This Visit        Cardiac/Vascular    Heart failure, unspecified HF chronicity, unspecified heart failure type       Endocrine    Morbid (severe) obesity due to excess calories      Other Visit Diagnoses     Chronic pain of left knee    -  Primary    Relevant Medications    meloxicam (MOBIC) 7.5 MG tablet    Other Relevant Orders    Ambulatory referral/consult to Orthopedics           Plan:     Pain  1.  Chronic left knee  Rx for Mobic 7.5 mg q.day as needed  Continue brace  Consult Dr. Ignacio per patient request    2.  CHF  Continue current medication  Keep scheduled appointment with Cardiology    3. Obesity  Diet/exercise

## 2022-06-02 ENCOUNTER — TELEPHONE (OUTPATIENT)
Dept: FAMILY MEDICINE | Facility: CLINIC | Age: 72
End: 2022-06-02
Payer: MEDICARE

## 2022-06-02 NOTE — TELEPHONE ENCOUNTER
----- Message from Beulah Mensah LPN sent at 6/1/2022  2:14 PM CDT -----  Regarding: FW: referral    ----- Message -----  From: Juliana Hawley  Sent: 6/1/2022   2:08 PM CDT  To: [facility] Staff  Subject: referral                                         Type:  Needs Medical Advice    Who Called: Pt  Symptoms (please be specific): Na   How long has patient had these symptoms:  NA  Pharmacy name and phone #:  NA  Would the patient rather a call back or a response via [facility]? Call back  Best Call Back Number: [phone]  Additional Information: Pt never received appt from  whom he was referred to.

## 2022-09-12 DIAGNOSIS — Z12.5 SCREENING PSA (PROSTATE SPECIFIC ANTIGEN): ICD-10-CM

## 2022-09-12 DIAGNOSIS — Z96.652 PRESENCE OF LEFT ARTIFICIAL KNEE JOINT: Primary | ICD-10-CM

## 2022-09-12 DIAGNOSIS — E13.9 DIABETES 1.5, MANAGED AS TYPE 2: ICD-10-CM

## 2022-09-12 DIAGNOSIS — Z13.6 SCREENING FOR ISCHEMIC HEART DISEASE: ICD-10-CM

## 2022-09-12 DIAGNOSIS — Z00.00 WELLNESS EXAMINATION: Primary | ICD-10-CM

## 2022-09-12 DIAGNOSIS — Z11.59 NEED FOR HEPATITIS C SCREENING TEST: ICD-10-CM

## 2022-09-12 DIAGNOSIS — Z11.4 ENCOUNTER FOR SCREENING FOR HIV: ICD-10-CM

## 2022-09-13 PROBLEM — Z96.652 S/P TOTAL KNEE ARTHROPLASTY, LEFT: Status: ACTIVE | Noted: 2022-09-13

## 2022-09-13 NOTE — PROGRESS NOTES
[facility]  Physical Therapy Initial Evaluation    Name: Jose De Jesus Oropeza  Clinic Number: [id number]    Therapy Diagnosis:   Encounter Diagnosis   Name Primary?    S/P total knee arthroplasty, left      Physician: Jose De Jesus Ignacio MD    Physician Orders: PT Eval and Treat  Medical Diagnosis from Referral: s/p left TKA  Evaluation Date: 9/14/2022  Authorization Period Expiration: Medically Necessary  Plan of Care Expiration: 12/14/2022  Visit # / Visits authorized: 0/ As needed    Time In: 1115  Time Out: 1147  Total Appointment Time (timed & untimed codes): 32 minutes  Total Treatment time (time-based codes) separate from Evaluation: 17 minutes    Surgery: Left TKA 08/30/2022  Orthopedic Precautions: WBAT LLE  Pertinent History: Hx lumbar surgery    Subjective   Jose De Jesus reports: s/p left TKA 08/30/2022. No pain to left knee. Walks daily (3 blocks per day) and is doing great post op. No longer uses assistive device. Does HEP up to three time per day.      Medical History:   Past Medical History:   Diagnosis Date    Arthritis     CHF (congestive heart failure)     Coronary artery disease     Hypertension     Hypothyroidism     Sleep apnea, unspecified     Spondylosis        Surgical History:   Jose De Jesus Oropeza  has a past surgical history that includes discemtomy.    Medications:   Jose De Jesus has a current medication list which includes the following prescription(s): amlodipine, diclofenac sodium, and valsartan.    Allergies:   Review of patient's allergies indicates:  No Known Allergies       Outcome Measure:  Eval: Defer KOS to follow up visit    Objective          Posture    Right - normal    Left - normal     Appearance    Left - healing well left knee incision with no signs of infection; min-moderate edema   Palpation      Left - ttp negative across left knee   Gait    decreased timothy, decreased step length, decreased stride length, and decreased weight acceptance to LLE ; no use of assistive  device     ROM    Right - WFL    Left - 1-2 degree lag extension, flexion to  degrees     Strength    KNEE FLX - Left 3+/5 with pain to posterior knee  KNEE EXT - Left 4+/5 within arc of motion  ANKLE DF - Left 5/5             TREATMENT     Jose De Jesus received the treatments listed below:       Time Activities   Manual     TherAct     TherEx 17 min Passive hamstring stretch left, NuStep, quad sets, scar mobilization and massage   Gait     Neuro Re-ed     Modalities     E-Stim     Dry Needling     Canalith Repositioning       Home Exercises and Patient Education Provided    Education provided:   - Plan of care, HEP    Written Home Exercises Provided: Patient instructed to cont prior HEP.  Exercises were reviewed and Jose De Jesus was able to demonstrate them prior to the end of the session.  Jose De Jesus demonstrated good  understanding of the education provided.       Assessment   Jose De Jesus is a 72 y.o. male referred to outpatient Physical Therapy with a medical diagnosis of s.p left TKA. Patient presents with decreased left knee strength and mobility. Overall presents with good healing and function post op although not at baseline. Patient will benefit from skilled outpatient Physical Therapy to address the deficits stated above and in the chart below, provide education, and to maximize patient's level of independence.    Patient prognosis is Good.     Plan of care discussed with patient: Yes  Patient's spiritual, cultural and educational needs considered and patient is agreeable to the plan of care and goals as stated below:    Anticipated Barriers for therapy: None    Goals:  Short Term Goals: 6 weeks   Patient will report at least 10% increase in functional capacity on KOS to indicate clinically significant functional improvement  Patient will demonstrate left knee ROM 0-110 degrees to allow greater functional capacity  Patient will be able to sit<>stand from low chair height without pain    Long Term Goals: 12 weeks    Patient will report at least 20% increase in functional capacity on KOS to indicate clinically significant functional improvement  Patient will demonstrate independence with HEP to allow home maintenance of functional gains    Plan   Plan of care Certification: 9/14/2022 to 12/14/2022.    Outpatient Physical Therapy 2-3 times weekly for 12 weeks to include the following interventions: Gait Training, Manual Therapy, Moist Heat/ Ice, Neuromuscular Re-ed, Patient Education, Self Care, Therapeutic Activities, and Therapeutic Exercise.     Corrie Olivera, PT

## 2022-09-14 ENCOUNTER — CLINICAL SUPPORT (OUTPATIENT)
Dept: REHABILITATION | Facility: HOSPITAL | Age: 72
End: 2022-09-14
Payer: MEDICARE

## 2022-09-14 DIAGNOSIS — Z96.652 S/P TOTAL KNEE ARTHROPLASTY, LEFT: ICD-10-CM

## 2022-09-14 PROCEDURE — 97110 THERAPEUTIC EXERCISES: CPT

## 2022-09-14 PROCEDURE — 97161 PT EVAL LOW COMPLEX 20 MIN: CPT

## 2022-09-15 ENCOUNTER — HISTORICAL (OUTPATIENT)
Dept: ADMINISTRATIVE | Facility: HOSPITAL | Age: 72
End: 2022-09-15
Payer: MEDICARE

## 2022-09-16 ENCOUNTER — CLINICAL SUPPORT (OUTPATIENT)
Dept: REHABILITATION | Facility: HOSPITAL | Age: 72
End: 2022-09-16
Payer: MEDICARE

## 2022-09-16 DIAGNOSIS — R29.898 LEFT LEG WEAKNESS: ICD-10-CM

## 2022-09-16 DIAGNOSIS — Z96.652 S/P TOTAL KNEE ARTHROPLASTY, LEFT: Primary | ICD-10-CM

## 2022-09-16 PROCEDURE — 97110 THERAPEUTIC EXERCISES: CPT

## 2022-09-16 PROCEDURE — 97140 MANUAL THERAPY 1/> REGIONS: CPT

## 2022-09-16 NOTE — PLAN OF CARE
Physical Therapy Treatment Note     Name: Jose De Jesus Oropeza  Clinic Number: [id number]    Therapy Diagnosis:   Encounter Diagnoses   Name Primary?    S/P total knee arthroplasty, left Yes    Left leg weakness      Physician: Jose De Jesus Ignacio MD    Visit Date: 9/16/2022    Physician Orders: PT Eval and Treat  Medical Diagnosis from Referral: s/p left TKA  Evaluation Date: 9/14/2022  Authorization Period Expiration: Medically Necessary  Plan of Care Expiration: 12/14/2022  Visit # / Visits authorized: 1/ As needed    Time In: 1003  Time Out: 49  Total Billable Time: 46 minutes    Surgery: Left TKA 08/30/2022  Orthopedic Precautions: WBAT LLE  Pertinent History: Hx lumbar surgery    Subjective     Patient reports: No adverse reports, feels good today.    Outcome Measure:  KOS: 31/70 = 44.3% functional    Objective     Jose De Jesus received the following treatment:     Time Activities   Manual 10 min LLE - low load sustained posterior capsule stretch (with and without QS), hamstring stretch   TherAct     TherEx 36 min NuStep, SLR, SAQ, hip add squeeze, bridge, QS, LAQ, HS curl, calf board stretch, knee flexion lunge stretch   Gait     Neuro Re-ed     Modalities     E-Stim     Dry Needling     Canalith Repositioning           Home Exercises Provided and Patient Education Provided     Education provided:   - HEP, plan of care    Assessment     Reaching TKE LLE with manual intervention. Less lag during SLR seen this visit as compared to previous (evaluation). Gradually build strength and increase functional mobility.    Patient prognosis is Excellent.      Anticipated barriers to physical therapy: None    Goals: Jose De Jesus Is progressing well towards his goals.  Short Term Goals: 6 weeks   Patient will report at least 10% increase in functional capacity on KOS to indicate clinically significant functional improvement  Patient will demonstrate left knee ROM 0-110 degrees to allow greater functional capacity  Patient will be able to  sit<>stand from low chair height without pain     Long Term Goals: 12 weeks   Patient will report at least 20% increase in functional capacity on KOS to indicate clinically significant functional improvement  Patient will demonstrate independence with HEP to allow home maintenance of functional gains     Plan     2-3x/week x 12 weeks    Corrie Olivera, PT

## 2022-09-19 ENCOUNTER — CLINICAL SUPPORT (OUTPATIENT)
Dept: REHABILITATION | Facility: HOSPITAL | Age: 72
End: 2022-09-19
Payer: MEDICARE

## 2022-09-19 DIAGNOSIS — Z96.652 S/P TOTAL KNEE ARTHROPLASTY, LEFT: Primary | ICD-10-CM

## 2022-09-19 DIAGNOSIS — R29.898 LEFT LEG WEAKNESS: ICD-10-CM

## 2022-09-19 PROCEDURE — 97530 THERAPEUTIC ACTIVITIES: CPT

## 2022-09-19 PROCEDURE — 97110 THERAPEUTIC EXERCISES: CPT

## 2022-09-19 PROCEDURE — 97140 MANUAL THERAPY 1/> REGIONS: CPT

## 2022-09-19 NOTE — PLAN OF CARE
Physical Therapy Treatment Note     Name: Jose De Jesus Oropeza  Clinic Number: [id number]    Therapy Diagnosis:   Encounter Diagnoses   Name Primary?    S/P total knee arthroplasty, left Yes    Left leg weakness      Physician: Jose De Jesus Ignacio MD    Visit Date: 9/19/2022    Physician Orders: PT Eval and Treat  Medical Diagnosis from Referral: s/p left TKA  Evaluation Date: 9/14/2022  Authorization Period Expiration: Medically Necessary  Plan of Care Expiration: 12/14/2022  Visit # / Visits authorized: 2/ As needed    Time In: 1050  Time Out: 1143  Total Billable Time: 53 minutes    Surgery: Left TKA 08/30/2022  Orthopedic Precautions: WBAT LLE  Pertinent History: Hx lumbar surgery    Subjective     Patient reports: No adverse reports, feels good today.    Outcome Measure:  KOS: 31/70 = 44.3% functional    Objective     Jose De Jesus received the following treatment:     Time Activities   Manual 10 min LLE - low load sustained posterior capsule stretch, hamstring stretch   TherAct 20 min Calf board stretch, knee flexion lunge stretch, sit<>stand, fwd step ups, monster walk   TherEx 23 min NuStep, SLR, SAQ, hip add squeeze, bridge, QS, LAQ, HS curl   Gait     Neuro Re-ed     Modalities     E-Stim     Dry Needling     Canalith Repositioning           Home Exercises Provided and Patient Education Provided     Education provided:   - HEP, plan of care    Assessment     Reaching TKE LLE with manual intervention. No lag during SLR, despite increase in resistance via ankle weight. Scar mobilizing well, mild resistance distally. Progressing very well, able to incorporate more standing closed chain activities. Gradually build strength and increase functional mobility.    Patient prognosis is Excellent.      Anticipated barriers to physical therapy: None    Goals: Jose De Jesus Is progressing well towards his goals.  Short Term Goals: 6 weeks   Patient will report at least 10% increase in functional capacity on KOS to indicate clinically  significant functional improvement  Patient will demonstrate left knee ROM 0-110 degrees to allow greater functional capacity  Patient will be able to sit<>stand from low chair height without pain     Long Term Goals: 12 weeks   Patient will report at least 20% increase in functional capacity on KOS to indicate clinically significant functional improvement  Patient will demonstrate independence with HEP to allow home maintenance of functional gains     Plan     2-3x/week x 12 weeks    Corrie Olivera, PT

## 2022-09-20 ENCOUNTER — LAB VISIT (OUTPATIENT)
Dept: LAB | Facility: HOSPITAL | Age: 72
End: 2022-09-20
Attending: INTERNAL MEDICINE
Payer: MEDICARE

## 2022-09-20 DIAGNOSIS — Z79.899 ENCOUNTER FOR LONG-TERM (CURRENT) USE OF OTHER MEDICATIONS: Primary | ICD-10-CM

## 2022-09-20 DIAGNOSIS — I77.6 ARTERITIS, UNSPECIFIED: ICD-10-CM

## 2022-09-20 DIAGNOSIS — L95.9 CUTANEOUS VASCULITIS: ICD-10-CM

## 2022-09-20 LAB
ALBUMIN SERPL-MCNC: 3.6 GM/DL (ref 3.4–4.8)
ALBUMIN/GLOB SERPL: 1 RATIO (ref 1.1–2)
ALP SERPL-CCNC: 107 UNIT/L (ref 40–150)
ALT SERPL-CCNC: 25 UNIT/L (ref 0–55)
AST SERPL-CCNC: 20 UNIT/L (ref 5–34)
BASOPHILS # BLD AUTO: 0.08 X10(3)/MCL (ref 0–0.2)
BASOPHILS NFR BLD AUTO: 1 %
BILIRUBIN DIRECT+TOT PNL SERPL-MCNC: 0.5 MG/DL
BUN SERPL-MCNC: 10 MG/DL (ref 8.4–25.7)
CALCIUM SERPL-MCNC: 9.6 MG/DL (ref 8.8–10)
CHLORIDE SERPL-SCNC: 108 MMOL/L (ref 98–107)
CK SERPL-CCNC: 70 U/L (ref 30–200)
CO2 SERPL-SCNC: 26 MMOL/L (ref 23–31)
CREAT SERPL-MCNC: 0.78 MG/DL (ref 0.73–1.18)
CRP SERPL-MCNC: 5.8 MG/L
EOSINOPHIL # BLD AUTO: 0.48 X10(3)/MCL (ref 0–0.9)
EOSINOPHIL NFR BLD AUTO: 5.8 %
ERYTHROCYTE [DISTWIDTH] IN BLOOD BY AUTOMATED COUNT: 13.5 % (ref 11.5–17)
ERYTHROCYTE [SEDIMENTATION RATE] IN BLOOD: 18 MM/HR (ref 0–15)
GFR SERPLBLD CREATININE-BSD FMLA CKD-EPI: >60 MLS/MIN/1.73/M2
GLOBULIN SER-MCNC: 3.7 GM/DL (ref 2.4–3.5)
GLUCOSE SERPL-MCNC: 112 MG/DL (ref 82–115)
HCT VFR BLD AUTO: 39 % (ref 42–52)
HGB BLD-MCNC: 12.9 GM/DL (ref 14–18)
IMM GRANULOCYTES # BLD AUTO: 0.03 X10(3)/MCL (ref 0–0.04)
IMM GRANULOCYTES NFR BLD AUTO: 0.4 %
LYMPHOCYTES # BLD AUTO: 2.03 X10(3)/MCL (ref 0.6–4.6)
LYMPHOCYTES NFR BLD AUTO: 24.7 %
MCH RBC QN AUTO: 32.8 PG (ref 27–31)
MCHC RBC AUTO-ENTMCNC: 33.1 MG/DL (ref 33–36)
MCV RBC AUTO: 99.2 FL (ref 80–94)
MONOCYTES # BLD AUTO: 0.73 X10(3)/MCL (ref 0.1–1.3)
MONOCYTES NFR BLD AUTO: 8.9 %
NEUTROPHILS # BLD AUTO: 4.9 X10(3)/MCL (ref 2.1–9.2)
NEUTROPHILS NFR BLD AUTO: 59.2 %
NRBC BLD AUTO-RTO: 0 %
PLATELET # BLD AUTO: 369 X10(3)/MCL (ref 130–400)
PMV BLD AUTO: 9.2 FL (ref 7.4–10.4)
POTASSIUM SERPL-SCNC: 4.4 MMOL/L (ref 3.5–5.1)
PROT SERPL-MCNC: 7.3 GM/DL (ref 5.8–7.6)
RBC # BLD AUTO: 3.93 X10(6)/MCL (ref 4.7–6.1)
SODIUM SERPL-SCNC: 142 MMOL/L (ref 136–145)
WBC # SPEC AUTO: 8.2 X10(3)/MCL (ref 4.5–11.5)

## 2022-09-20 PROCEDURE — 86140 C-REACTIVE PROTEIN: CPT

## 2022-09-20 PROCEDURE — 86036 ANCA SCREEN EACH ANTIBODY: CPT

## 2022-09-20 PROCEDURE — 82550 ASSAY OF CK (CPK): CPT

## 2022-09-20 PROCEDURE — 85025 COMPLETE CBC W/AUTO DIFF WBC: CPT

## 2022-09-20 PROCEDURE — 85651 RBC SED RATE NONAUTOMATED: CPT

## 2022-09-20 PROCEDURE — 80053 COMPREHEN METABOLIC PANEL: CPT

## 2022-09-20 PROCEDURE — 36415 COLL VENOUS BLD VENIPUNCTURE: CPT

## 2022-09-21 ENCOUNTER — CLINICAL SUPPORT (OUTPATIENT)
Dept: REHABILITATION | Facility: HOSPITAL | Age: 72
End: 2022-09-21
Payer: MEDICARE

## 2022-09-21 DIAGNOSIS — R29.898 LEFT LEG WEAKNESS: ICD-10-CM

## 2022-09-21 DIAGNOSIS — Z96.652 S/P TOTAL KNEE ARTHROPLASTY, LEFT: Primary | ICD-10-CM

## 2022-09-21 PROCEDURE — 97530 THERAPEUTIC ACTIVITIES: CPT

## 2022-09-21 PROCEDURE — 97110 THERAPEUTIC EXERCISES: CPT

## 2022-09-21 PROCEDURE — 97140 MANUAL THERAPY 1/> REGIONS: CPT

## 2022-09-21 NOTE — PLAN OF CARE
Physical Therapy Treatment Note     Name: Jose De Jesus Oropeza  Clinic Number: [id number]    Therapy Diagnosis:   Encounter Diagnoses   Name Primary?    S/P total knee arthroplasty, left Yes    Left leg weakness      Physician: Jose De Jesus Ignacio MD    Visit Date: 9/21/2022    Physician Orders: PT Eval and Treat  Medical Diagnosis from Referral: s/p left TKA  Evaluation Date: 9/14/2022  Authorization Period Expiration: Medically Necessary  Plan of Care Expiration: 12/14/2022  Visit # / Visits authorized: 3/ As needed    Time In: 1247  Time Out: 1328  Total Billable Time: 41 minutes    Surgery: Left TKA 08/30/2022  Orthopedic Precautions: WBAT LLE  Pertinent History: Hx lumbar surgery    Subjective     Patient reports: No adverse reports, feels good today. No significant soreness following previous session.    Outcome Measure:  KOS: 31/70 = 44.3% functional    Objective     Jose De Jesus received the following treatment:     Time Activities   Manual 9 min LLE - low load sustained posterior capsule stretch, hamstring stretch   TherAct 16 min Calf board stretch, knee flexion lunge stretch, sit<>stand, fwd step ups, lat step ups, monster walk   TherEx 16 min SLR, SAQ, bridge w/ adduction squeeze, QS, LAQ, HS curl   Gait     Neuro Re-ed     Modalities     E-Stim     Dry Needling     Canalith Repositioning           Home Exercises Provided and Patient Education Provided     Education provided:   - HEP, plan of care    Assessment     Reaching TKE LLE with manual intervention. No lag during SLR, despite increase in resistance via ankle weight. Scar mobilizing well, mild resistance distally. Progressing very well, able to incorporate more standing closed chain activities. Gradually build strength and increase functional mobility.    Patient prognosis is Excellent.      Anticipated barriers to physical therapy: None    Goals: Jose De Jesus Is progressing well towards his goals.  Short Term Goals: 6 weeks   Patient will report at least 10% increase  in functional capacity on KOS to indicate clinically significant functional improvement  Patient will demonstrate left knee ROM 0-110 degrees to allow greater functional capacity  Patient will be able to sit<>stand from low chair height without pain     Long Term Goals: 12 weeks   Patient will report at least 20% increase in functional capacity on KOS to indicate clinically significant functional improvement  Patient will demonstrate independence with HEP to allow home maintenance of functional gains     Plan     2-3x/week x 12 weeks    Corrie Olivera, PT

## 2022-09-22 LAB
C-ANCA TITR SER IF: NEGATIVE {TITER}
P-ANCA SER QL IF: POSITIVE

## 2022-09-23 ENCOUNTER — CLINICAL SUPPORT (OUTPATIENT)
Dept: REHABILITATION | Facility: HOSPITAL | Age: 72
End: 2022-09-23
Payer: MEDICARE

## 2022-09-23 DIAGNOSIS — R29.898 LEFT LEG WEAKNESS: ICD-10-CM

## 2022-09-23 DIAGNOSIS — Z96.652 S/P TOTAL KNEE ARTHROPLASTY, LEFT: Primary | ICD-10-CM

## 2022-09-23 PROCEDURE — 97530 THERAPEUTIC ACTIVITIES: CPT

## 2022-09-23 PROCEDURE — 97110 THERAPEUTIC EXERCISES: CPT

## 2022-09-23 NOTE — PLAN OF CARE
Physical Therapy Treatment Note     Name: Jose De Jesus Oropeza  Clinic Number: [id number]    Therapy Diagnosis:   Encounter Diagnoses   Name Primary?    S/P total knee arthroplasty, left Yes    Left leg weakness      Physician: Jose De Jesus Ignacio MD    Visit Date: 9/23/2022    Physician Orders: PT Eval and Treat  Medical Diagnosis from Referral: s/p left TKA  Evaluation Date: 9/14/2022  Authorization Period Expiration: Medically Necessary  Plan of Care Expiration: 12/14/2022  Visit # / Visits authorized: 4/ As needed    Time In: 1002  Time Out: 1044  Total Billable Time: 42 minutes    Surgery: Left TKA 08/30/2022  Orthopedic Precautions: WBAT LLE  Pertinent History: Hx lumbar surgery    Subjective     Patient reports: No adverse reports. Did some outdoor activity yesterday that made the left knee a bit sore, however went home and applied ice and the soreness was gone.    Outcome Measure:  KOS: 31/70 = 44.3% functional    Objective     Jose De Jesus received the following treatment:     Time Activities   Manual     TherAct 19 min Calf board stretch, knee flexion lunge stretch, sit<>stand, fwd step ups, lat step ups, monster walk   TherEx 23 min SLR, SAQ, bridge w/ adduction squeeze, QS, LAQ, HS curl, LLE (low load sustained posterior capsule stretch, hamstring stretch)   Gait     Neuro Re-ed     Modalities     E-Stim     Dry Needling     Canalith Repositioning           Home Exercises Provided and Patient Education Provided     Education provided:   - HEP, plan of care    Assessment     Reaching TKE LLE with manual intervention. No lag during SLR, despite increase in resistance via ankle weight. Scar mobilizing well, mild resistance distally. Progressing very well, able to incorporate more standing closed chain activities. Gradually build strength and increase functional mobility.    Patient prognosis is Excellent.      Anticipated barriers to physical therapy: None    Goals: Jose De Jesus Is progressing well towards his goals.  Short  Term Goals: 6 weeks   Patient will report at least 10% increase in functional capacity on KOS to indicate clinically significant functional improvement  Patient will demonstrate left knee ROM 0-110 degrees to allow greater functional capacity  Patient will be able to sit<>stand from low chair height without pain     Long Term Goals: 12 weeks   Patient will report at least 20% increase in functional capacity on KOS to indicate clinically significant functional improvement  Patient will demonstrate independence with HEP to allow home maintenance of functional gains     Plan     2-3x/week x 12 weeks    Corrie Olivera, PT

## 2022-09-26 ENCOUNTER — CLINICAL SUPPORT (OUTPATIENT)
Dept: REHABILITATION | Facility: HOSPITAL | Age: 72
End: 2022-09-26
Payer: MEDICARE

## 2022-09-26 DIAGNOSIS — Z96.652 S/P TOTAL KNEE ARTHROPLASTY, LEFT: ICD-10-CM

## 2022-09-26 DIAGNOSIS — R29.898 LEFT LEG WEAKNESS: Primary | ICD-10-CM

## 2022-09-26 PROCEDURE — 97530 THERAPEUTIC ACTIVITIES: CPT

## 2022-09-26 PROCEDURE — 97110 THERAPEUTIC EXERCISES: CPT

## 2022-09-26 NOTE — PLAN OF CARE
Physical Therapy Treatment Note     Name: Jose De Jesus Oropeza  Clinic Number: [id number]    Therapy Diagnosis:   Encounter Diagnoses   Name Primary?    Left leg weakness Yes    S/P total knee arthroplasty, left      Physician: Jose De Jesus Ignacio MD    Visit Date: 9/26/2022    Physician Orders: PT Eval and Treat  Medical Diagnosis from Referral: s/p left TKA  Evaluation Date: 9/14/2022  Authorization Period Expiration: Medically Necessary  Plan of Care Expiration: 12/14/2022  Visit # / Visits authorized: 5/ As needed    Time In: 0720  Time Out: 0813  Total Billable Time: 53 minutes    Surgery: Left TKA 08/30/2022  Orthopedic Precautions: WBAT LLE  Pertinent History: Hx lumbar surgery    Subjective     Patient reports: No adverse reports. Got on his tractor over the weekend. Left knee feels tight posterior capsule.    Outcome Measure:  KOS: 31/70 = 44.3% functional    Objective     Jose De Jesus received the following treatment:     Time Activities   Manual     TherAct 28 min Calf board stretch, knee flexion lunge stretch, sit<>stand, fwd step ups, lat step ups, monster walk, NuStep   TherEx 25 min SLR, QS, LAQ, HS curl, LLE (low load sustained posterior capsule stretch (passive and seated with weight hang), scar mobilization, posterior knee Biofreeze application, hamstring stretch   Gait     Neuro Re-ed     Modalities     E-Stim     Dry Needling     Canalith Repositioning           Home Exercises Provided and Patient Education Provided     Education provided:   - HEP, plan of care    Assessment     Transitioning to more standing closed chain activities to help build load tolerance. Step ups are most challenging activity (fwd less challenging than lateral), however expect this to lessen in difficulty as his strength increases. Scar healing very well, ~90% closed; following full closure, will begin quad stretches. Scar mobilizing well, mild resistance distally.    Patient prognosis is Excellent.      Anticipated barriers to physical  therapy: None    Goals: Jose De Jesus Is progressing well towards his goals.  Short Term Goals: 6 weeks   Patient will report at least 10% increase in functional capacity on KOS to indicate clinically significant functional improvement  Patient will demonstrate left knee ROM 0-110 degrees to allow greater functional capacity  Patient will be able to sit<>stand from low chair height without pain     Long Term Goals: 12 weeks   Patient will report at least 20% increase in functional capacity on KOS to indicate clinically significant functional improvement  Patient will demonstrate independence with HEP to allow home maintenance of functional gains     Plan     2-3x/week x 12 weeks    Corrie Olivera, PT

## 2022-09-28 ENCOUNTER — CLINICAL SUPPORT (OUTPATIENT)
Dept: REHABILITATION | Facility: HOSPITAL | Age: 72
End: 2022-09-28
Payer: MEDICARE

## 2022-09-28 DIAGNOSIS — Z96.652 S/P TOTAL KNEE ARTHROPLASTY, LEFT: Primary | ICD-10-CM

## 2022-09-28 DIAGNOSIS — R29.898 LEFT LEG WEAKNESS: ICD-10-CM

## 2022-09-28 PROCEDURE — 97530 THERAPEUTIC ACTIVITIES: CPT

## 2022-09-28 NOTE — PLAN OF CARE
Physical Therapy Treatment Note     Name: Jose De Jesus Oropeza  Clinic Number: [id number]    Therapy Diagnosis:   Encounter Diagnoses   Name Primary?    S/P total knee arthroplasty, left Yes    Left leg weakness      Physician: Jose De Jesus Ignacio MD    Visit Date: 9/28/2022    Physician Orders: PT Eval and Treat  Medical Diagnosis from Referral: s/p left TKA  Evaluation Date: 9/14/2022  Authorization Period Expiration: Medically Necessary  Plan of Care Expiration: 12/14/2022  Visit # / Visits authorized: 6/ As needed    Time In: 1254  Time Out: 1347  Total Billable Time: 53 minutes    Surgery: Left TKA 08/30/2022  Orthopedic Precautions: WBAT LLE  Pertinent History: Hx lumbar surgery    Subjective     Patient reports: No adverse reports. Did his morning walking this morning.    Outcome Measure:  Eval: KOS: 31/70 = 44.3% functional  09/28/22: KOS: 55/70 = 78.6% functional    Objective     Jose De Jesus received the following treatment:     Time Activities   Manual     TherAct 53 min Self stretch (calf board, knee flexion lunge, hamstring dynamic), sit<>stand, step ups (fwd, lat), monster walk, BOSU step into lunge (fwd, lat), wall TKE, NuStep, seated post capsule stretch via weight hang w/ MHP, Biofreeze to post left knee   TherEx  NuStep, SLR, QS, LAQ, HS curl, LLE (low load sustained posterior capsule stretch (passive and seated with weight hang), scar mobilization, posterior knee Biofreeze application, hamstring stretch   Gait     Neuro Re-ed     Modalities     E-Stim     Dry Needling     Canalith Repositioning           Home Exercises Provided and Patient Education Provided     Education provided:   - HEP, plan of care    Assessment     Transitioning to more standing closed chain activities to help build load tolerance. Step ups are most challenging activity (fwd less challenging than lateral), however expect this to lessen in difficulty as his strength increases. Scar healing very well, ~90% closed; following full closure,  will begin quad stretches. Scar mobilizing well, mild resistance distally.    Patient prognosis is Excellent.      Anticipated barriers to physical therapy: None    Goals: Jose De Jesus Is progressing well towards his goals.  Short Term Goals: 6 weeks   Patient will report at least 10% increase in functional capacity on KOS to indicate clinically significant functional improvement  Patient will demonstrate left knee ROM 0-110 degrees to allow greater functional capacity  Patient will be able to sit<>stand from low chair height without pain     Long Term Goals: 12 weeks   Patient will report at least 20% increase in functional capacity on KOS to indicate clinically significant functional improvement  Patient will demonstrate independence with HEP to allow home maintenance of functional gains     Plan     2-3x/week x 12 weeks    Corrie Olivera, PT

## 2022-09-29 ENCOUNTER — LAB VISIT (OUTPATIENT)
Dept: LAB | Facility: HOSPITAL | Age: 72
End: 2022-09-29
Attending: FAMILY MEDICINE
Payer: MEDICARE

## 2022-09-29 DIAGNOSIS — Z12.5 SCREENING PSA (PROSTATE SPECIFIC ANTIGEN): ICD-10-CM

## 2022-09-29 DIAGNOSIS — E13.9 DIABETES 1.5, MANAGED AS TYPE 2: ICD-10-CM

## 2022-09-29 DIAGNOSIS — Z11.59 NEED FOR HEPATITIS C SCREENING TEST: ICD-10-CM

## 2022-09-29 DIAGNOSIS — Z11.4 ENCOUNTER FOR SCREENING FOR HIV: ICD-10-CM

## 2022-09-29 DIAGNOSIS — Z00.00 WELLNESS EXAMINATION: ICD-10-CM

## 2022-09-29 DIAGNOSIS — Z13.6 SCREENING FOR ISCHEMIC HEART DISEASE: ICD-10-CM

## 2022-09-29 LAB
ALBUMIN SERPL-MCNC: 3.7 GM/DL (ref 3.4–4.8)
ALBUMIN/GLOB SERPL: 1 RATIO (ref 1.1–2)
ALP SERPL-CCNC: 108 UNIT/L (ref 40–150)
ALT SERPL-CCNC: 30 UNIT/L (ref 0–55)
AST SERPL-CCNC: 21 UNIT/L (ref 5–34)
BASOPHILS # BLD AUTO: 0.09 X10(3)/MCL (ref 0–0.2)
BASOPHILS NFR BLD AUTO: 1.2 %
BILIRUBIN DIRECT+TOT PNL SERPL-MCNC: 0.4 MG/DL
BUN SERPL-MCNC: 12 MG/DL (ref 8.4–25.7)
CALCIUM SERPL-MCNC: 9.6 MG/DL (ref 8.8–10)
CHLORIDE SERPL-SCNC: 108 MMOL/L (ref 98–107)
CHOLEST SERPL-MCNC: 148 MG/DL
CHOLEST/HDLC SERPL: 4 {RATIO} (ref 0–5)
CO2 SERPL-SCNC: 25 MMOL/L (ref 23–31)
CREAT SERPL-MCNC: 0.83 MG/DL (ref 0.73–1.18)
CREAT UR-MCNC: 141.5 MG/DL (ref 63–166)
EOSINOPHIL # BLD AUTO: 0.35 X10(3)/MCL (ref 0–0.9)
EOSINOPHIL NFR BLD AUTO: 4.7 %
ERYTHROCYTE [DISTWIDTH] IN BLOOD BY AUTOMATED COUNT: 13.3 % (ref 11.5–17)
EST. AVERAGE GLUCOSE BLD GHB EST-MCNC: 108.3 MG/DL
GFR SERPLBLD CREATININE-BSD FMLA CKD-EPI: >60 MLS/MIN/1.73/M2
GLOBULIN SER-MCNC: 3.6 GM/DL (ref 2.4–3.5)
GLUCOSE SERPL-MCNC: 113 MG/DL (ref 82–115)
HBA1C MFR BLD: 5.4 %
HCT VFR BLD AUTO: 39.3 % (ref 42–52)
HCV AB SERPL QL IA: NONREACTIVE
HDLC SERPL-MCNC: 40 MG/DL (ref 35–60)
HGB BLD-MCNC: 13 GM/DL (ref 14–18)
HIV 1+2 AB+HIV1 P24 AG SERPL QL IA: NONREACTIVE
IMM GRANULOCYTES # BLD AUTO: 0.02 X10(3)/MCL (ref 0–0.04)
IMM GRANULOCYTES NFR BLD AUTO: 0.3 %
LDLC SERPL CALC-MCNC: 87 MG/DL (ref 50–140)
LYMPHOCYTES # BLD AUTO: 2.04 X10(3)/MCL (ref 0.6–4.6)
LYMPHOCYTES NFR BLD AUTO: 27.6 %
MCH RBC QN AUTO: 32.9 PG (ref 27–31)
MCHC RBC AUTO-ENTMCNC: 33.1 MG/DL (ref 33–36)
MCV RBC AUTO: 99.5 FL (ref 80–94)
MICROALBUMIN UR-MCNC: 6.3 UG/ML
MICROALBUMIN/CREAT RATIO PNL UR: 4.5 MG/GM CR (ref 0–30)
MONOCYTES # BLD AUTO: 0.71 X10(3)/MCL (ref 0.1–1.3)
MONOCYTES NFR BLD AUTO: 9.6 %
NEUTROPHILS # BLD AUTO: 4.2 X10(3)/MCL (ref 2.1–9.2)
NEUTROPHILS NFR BLD AUTO: 56.6 %
NRBC BLD AUTO-RTO: 0 %
PLATELET # BLD AUTO: 313 X10(3)/MCL (ref 130–400)
PMV BLD AUTO: 9.3 FL (ref 7.4–10.4)
POTASSIUM SERPL-SCNC: 4.4 MMOL/L (ref 3.5–5.1)
PROT SERPL-MCNC: 7.3 GM/DL (ref 5.8–7.6)
PSA SERPL-MCNC: 0.57 NG/ML
RBC # BLD AUTO: 3.95 X10(6)/MCL (ref 4.7–6.1)
SODIUM SERPL-SCNC: 144 MMOL/L (ref 136–145)
TRIGL SERPL-MCNC: 105 MG/DL (ref 34–140)
VLDLC SERPL CALC-MCNC: 21 MG/DL
WBC # SPEC AUTO: 7.4 X10(3)/MCL (ref 4.5–11.5)

## 2022-09-29 PROCEDURE — 86803 HEPATITIS C AB TEST: CPT

## 2022-09-29 PROCEDURE — 84153 ASSAY OF PSA TOTAL: CPT

## 2022-09-29 PROCEDURE — 36415 COLL VENOUS BLD VENIPUNCTURE: CPT

## 2022-09-29 PROCEDURE — 80061 LIPID PANEL: CPT

## 2022-09-29 PROCEDURE — 82043 UR ALBUMIN QUANTITATIVE: CPT

## 2022-09-29 PROCEDURE — 83036 HEMOGLOBIN GLYCOSYLATED A1C: CPT

## 2022-09-29 PROCEDURE — 85025 COMPLETE CBC W/AUTO DIFF WBC: CPT

## 2022-09-29 PROCEDURE — 87389 HIV-1 AG W/HIV-1&-2 AB AG IA: CPT

## 2022-09-29 PROCEDURE — 80053 COMPREHEN METABOLIC PANEL: CPT

## 2022-09-30 ENCOUNTER — CLINICAL SUPPORT (OUTPATIENT)
Dept: REHABILITATION | Facility: HOSPITAL | Age: 72
End: 2022-09-30
Payer: MEDICARE

## 2022-09-30 DIAGNOSIS — R29.898 LEFT LEG WEAKNESS: ICD-10-CM

## 2022-09-30 DIAGNOSIS — Z96.652 S/P TOTAL KNEE ARTHROPLASTY, LEFT: Primary | ICD-10-CM

## 2022-09-30 PROCEDURE — 97530 THERAPEUTIC ACTIVITIES: CPT

## 2022-09-30 NOTE — PLAN OF CARE
Physical Therapy Treatment Note     Name: Jose De Jesus Oropeza  Clinic Number: [id number]    Therapy Diagnosis:   Encounter Diagnoses   Name Primary?    S/P total knee arthroplasty, left Yes    Left leg weakness      Physician: Jose De Jesus Ignacio MD    Visit Date: 9/30/2022    Physician Orders: PT Eval and Treat  Medical Diagnosis from Referral: s/p left TKA  Evaluation Date: 9/14/2022  Authorization Period Expiration: Medically Necessary  Plan of Care Expiration: 12/14/2022  Visit # / Visits authorized: 7/ As needed    Time In: 1010  Time Out: 1054  Total Billable Time: 44 minutes    Surgery: Left TKA 08/30/2022  Orthopedic Precautions: WBAT LLE  Pertinent History: Hx lumbar surgery    Subjective     Patient reports: No adverse reports. States the left knee feels better and better each day.    Outcome Measure:  Eval: KOS: 31/70 = 44.3% functional  09/28/22: KOS: 55/70 = 78.6% functional    Objective     Jose De Jesus received the following treatment:     Time Activities   Manual     TherAct 44 min NuStep, post capsule stretch (seated with MHP and weights, supine passive), QS (w/ post capsule stretch), hamstring stretch, SLR, self stretch (calf board, knee flexion lunge), sit<>stand, step ups (fwd, lat), BOSU step into lunge (fwd, lat), standing band TKE   TherEx  NuStep, SLR, QS, LAQ, HS curl, LLE (low load sustained posterior capsule stretch (passive and seated with weight hang), scar mobilization, posterior knee Biofreeze application, hamstring stretch   Gait     Neuro Re-ed     Modalities     E-Stim     Dry Needling     Canalith Repositioning           Home Exercises Provided and Patient Education Provided     Education provided:   - HEP, plan of care    Assessment     Transitioning to more standing closed chain activities to help build load tolerance. Step ups lessening in difficulty, as is sit<>stand from standard chair height. Posterior capsule also improving flexibility, significantly less restriction with TKE. Scar  healing very well, ~95% closed; following full closure, will begin quad stretches. Scar mobilizing well, mild resistance distally.    Patient prognosis is Excellent.      Anticipated barriers to physical therapy: None    Goals: Jose De Jesus Is progressing well towards his goals.  Short Term Goals: 6 weeks   Patient will report at least 10% increase in functional capacity on KOS to indicate clinically significant functional improvement  Patient will demonstrate left knee ROM 0-110 degrees to allow greater functional capacity  Patient will be able to sit<>stand from low chair height without pain     Long Term Goals: 12 weeks   Patient will report at least 20% increase in functional capacity on KOS to indicate clinically significant functional improvement  Patient will demonstrate independence with HEP to allow home maintenance of functional gains     Plan     2-3x/week x 12 weeks    Corrie Olivera, PT

## 2022-10-03 ENCOUNTER — CLINICAL SUPPORT (OUTPATIENT)
Dept: REHABILITATION | Facility: HOSPITAL | Age: 72
End: 2022-10-03
Payer: MEDICARE

## 2022-10-03 DIAGNOSIS — R29.898 LEFT LEG WEAKNESS: Primary | ICD-10-CM

## 2022-10-03 PROCEDURE — 97530 THERAPEUTIC ACTIVITIES: CPT

## 2022-10-03 NOTE — PLAN OF CARE
Physical Therapy Treatment Note     Name: Jose De Jesus Oropeza  Clinic Number: [id number]    Therapy Diagnosis:   Encounter Diagnosis   Name Primary?    Left leg weakness Yes     Physician: Jose De Jesus Ignacio MD    Visit Date: 10/3/2022    Physician Orders: PT Eval and Treat  Medical Diagnosis from Referral: s/p left TKA  Evaluation Date: 9/14/2022  Authorization Period Expiration: Medically Necessary  Plan of Care Expiration: 12/14/2022  Visit # / Visits authorized: 8/ As needed    Time In: 0800  Time Out: 0844  Total Billable Time: 44 minutes    Surgery: Left TKA 08/30/2022  Orthopedic Precautions: WBAT LLE  Pertinent History: Hx lumbar surgery    Subjective     Patient reports: No adverse reports. States the left knee feels better and better each day.    Outcome Measure:  Eval: KOS: 31/70 = 44.3% functional  09/28/22: KOS: 55/70 = 78.6% functional    Objective     Jose De Jesus received the following treatment:     Time Activities   Manual     TherAct 44 min NuStep, post capsule stretch (seated with MHP and weights, supine passive), QS (w/ post capsule stretch), hamstring stretch (with DF/PF), SLR, self stretch (calf board, knee flexion lunge), sit<>stand   TherEx  NuStep, SLR, QS, LAQ, HS curl, LLE (low load sustained posterior capsule stretch (passive and seated with weight hang), scar mobilization, posterior knee Biofreeze application, hamstring stretch   Gait     Neuro Re-ed     Modalities     E-Stim     Dry Needling     Canalith Repositioning           Home Exercises Provided and Patient Education Provided     Education provided:   - HEP, plan of care    Assessment     Transitioning to more standing closed chain activities to help build load tolerance. Posterior knee capsule significant less tight, getting to terminal knee extension with no pain but mild stretch. Step ups lessening in difficulty, as is sit<>stand from standard chair height. Posterior capsule also improving flexibility, significantly less restriction with  TKE. Scar healing very well, ~95% closed; following full closure, will begin quad stretches. Scar mobilizing well, mild resistance distally.    Patient prognosis is Excellent.      Anticipated barriers to physical therapy: None    Goals: Jose De Jesus Is progressing well towards his goals.  Short Term Goals: 6 weeks   Patient will report at least 10% increase in functional capacity on KOS to indicate clinically significant functional improvement  Patient will demonstrate left knee ROM 0-110 degrees to allow greater functional capacity  Patient will be able to sit<>stand from low chair height without pain     Long Term Goals: 12 weeks   Patient will report at least 20% increase in functional capacity on KOS to indicate clinically significant functional improvement  Patient will demonstrate independence with HEP to allow home maintenance of functional gains     Plan     2-3x/week x 12 weeks    Corrie Olivera, PT

## 2022-10-05 ENCOUNTER — OFFICE VISIT (OUTPATIENT)
Dept: FAMILY MEDICINE | Facility: CLINIC | Age: 72
End: 2022-10-05
Payer: MEDICARE

## 2022-10-05 ENCOUNTER — CLINICAL SUPPORT (OUTPATIENT)
Dept: REHABILITATION | Facility: HOSPITAL | Age: 72
End: 2022-10-05
Payer: MEDICARE

## 2022-10-05 VITALS
BODY MASS INDEX: 32.76 KG/M2 | HEIGHT: 71 IN | RESPIRATION RATE: 20 BRPM | OXYGEN SATURATION: 98 % | HEART RATE: 78 BPM | SYSTOLIC BLOOD PRESSURE: 130 MMHG | WEIGHT: 234 LBS | DIASTOLIC BLOOD PRESSURE: 60 MMHG | TEMPERATURE: 98 F

## 2022-10-05 DIAGNOSIS — R29.898 LEFT LEG WEAKNESS: Primary | ICD-10-CM

## 2022-10-05 DIAGNOSIS — I50.9 HEART FAILURE, UNSPECIFIED HF CHRONICITY, UNSPECIFIED HEART FAILURE TYPE: ICD-10-CM

## 2022-10-05 DIAGNOSIS — Z00.00 WELLNESS EXAMINATION: Primary | ICD-10-CM

## 2022-10-05 PROCEDURE — G0439 PPPS, SUBSEQ VISIT: HCPCS | Mod: ,,, | Performed by: FAMILY MEDICINE

## 2022-10-05 PROCEDURE — G0439 PR MEDICARE ANNUAL WELLNESS SUBSEQUENT VISIT: ICD-10-PCS | Mod: ,,, | Performed by: FAMILY MEDICINE

## 2022-10-05 PROCEDURE — 97530 THERAPEUTIC ACTIVITIES: CPT

## 2022-10-05 RX ORDER — METHOTREXATE 2.5 MG/1
TABLET ORAL
COMMUNITY
Start: 2022-06-13

## 2022-10-05 NOTE — PROGRESS NOTES
Patient ID: [id number]     Chief Complaint: Medicare AWV (DECLINES flu vaccine )      HPI:     Jose De Jesus Oropeza is a 72 y.o. male here today for a Medicare Wellness.       Opioid Screening: Patient medication list reviewed, patient is not taking prescription opioids. Patient is not using additional opioids than prescribed. Patient is not at low risk of substance abuse based on this opioid use history.       ----------------------------  Arthritis  CHF (congestive heart failure)  Coronary artery disease  Hypertension  Hypothyroidism  Sleep apnea, unspecified  Spondylosis     Past Surgical History:   Procedure Laterality Date    discemtomy         Review of patient's allergies indicates:  No Known Allergies    Outpatient Medications Marked as Taking for the 10/5/22 encounter (Office Visit) with Babak Grajeda MD   Medication Sig Dispense Refill    amLODIPine (NORVASC) 5 MG tablet Take 5 mg by mouth once daily.      methotrexate 2.5 MG Tab TAKE 8 TABLETS BY MOUTH ONCE A WEEK, HOLD FOR FEVER, INFECTION, OR SURGERY FOR 84 DAYS      valsartan (DIOVAN) 320 MG tablet Take 320 mg by mouth.         Social History     Socioeconomic History    Marital status:    Tobacco Use    Smoking status: Never    Smokeless tobacco: Never   Substance and Sexual Activity    Alcohol use: Not Currently    Drug use: Never        History reviewed. No pertinent family history.     Patient Care Team:  Babak Grajeda MD as PCP - General (Family Medicine)  MD Go Fong FNP as Nurse Practitioner (Internal Medicine)  Primo Salcedo MD as Consulting Physician (Cardiology)  Niko Martin MD as Consulting Physician (Urology)  Ashok Whitt MD as Consulting Physician (Rheumatology)       Subjective:     Review of Systems   Constitutional: Negative.    HENT: Negative.     Respiratory: Negative.     Cardiovascular: Negative.         Heart failure: followed by Dr Salcedo   Gastrointestinal: Negative.    Genitourinary:  Negative.    Musculoskeletal: Negative.    Neurological: Negative.    Endo/Heme/Allergies: Negative.    Psychiatric/Behavioral: Negative.         Patient Reported Health Risk Assessment  [age] 70-80  Are you male or female?: Male  During the past four weeks, how much have you been bothered by emotional problems such as feeling anxious, depressed, irritable, sad, or downhearted and blue?: Not at all  During the past five weeks, has your physical and/or emotional health limited your social activities with family, friends, neighbors, or groups?: Not at all  During the past four weeks, how much bodily pain have you generally had?: No pain  During the past four weeks, was someone available to help if you needed and wanted help?: Yes, as much as I wanted  During the past four weeks, what was the hardest physical activity you could do for at least two minutes?: Heavy  Can you get to places out of walking distance without help?  (For example, can you travel alone on buses or taxis, or drive your own car?): Yes  Can you go shopping for groceries or clothes without someone's help?: Yes  Can you prepare your own meals?: Yes  Can you do your own housework without help?: Yes  Because of any health problems, do you need the help of another person with your personal care needs such as eating, bathing, dressing, or getting around the house?: No  Can you handle your own money without help?: Yes  During the past four weeks, how would you rate your health in general?: Good  How have things been going for you during the past four weeks?: Pretty well  Are you having difficulties driving your car?: No  Do you always fasten your seat belt when you are in a car?: No  How often in the past four weeks have you been bothered by falling or dizzy when standing up?: Never  How often in the past four weeks have you been bothered by sexual problems?: Never  How often in the past four weeks have you been bothered by trouble eating  "well?: Never  How often in the past four weeks have you been bothered by teeth or denture problems?: Never  How often in the past four weeks have you been bothered with problems using the telephone?: Never  How often in the past four weeks have you been bothered by tiredness or fatigue?: Never  Have you fallen two or more times in the past year?: No  Are you afraid of falling?: No  Are you a smoker?: No  During the past four weeks, how many drinks of wine, beer, or other alcoholic beverages did you have?: One drink or less per week  Do you exercise for about 20 minutes three or more days a week?: Yes, some of the time  Have you been given any information to help you with hazards in your house that might hurt you?: Yes  Have you been given any information to help you with keeping track of your medications?: Yes  How often do you have trouble taking medicines the way you've been told to take them?: I always take them as prescribed  How confident are you that you can control and manage most of your health problems?: Somewhat confident  What is your race? (Check all that apply.):     Objective:     /60 (BP Location: Left arm, Patient Position: Sitting, BP Method: Medium (Manual))   Pulse 78   Temp 98.1 °F (36.7 °C) (Temporal)   Resp 20   Ht 5' 11" (1.803 m)   Wt 106.1 kg (234 lb)   SpO2 98%   BMI 32.64 kg/m²     Physical Exam  Constitutional:       General: He is not in acute distress.     Appearance: Normal appearance.   Cardiovascular:      Rate and Rhythm: Normal rate and regular rhythm.   Pulmonary:      Effort: Pulmonary effort is normal.      Breath sounds: Normal breath sounds.   Abdominal:      General: Abdomen is flat. Bowel sounds are normal.      Palpations: Abdomen is soft.   Musculoskeletal:         General: Normal range of motion.   Neurological:      Mental Status: He is alert.   Psychiatric:         Mood and Affect: Mood normal.         Behavior: Behavior normal.         Thought " Content: Thought content normal.         Judgment: Judgment normal.     Recent Results (from the past 504 hour(s))   C-Reactive Protein    Collection Time: 09/20/22  8:21 AM   Result Value Ref Range    C-Reactive Protein 5.80 (H) <5.00 mg/L   Comprehensive Metabolic Panel    Collection Time: 09/20/22  8:21 AM   Result Value Ref Range    Sodium Level 142 136 - 145 mmol/L    Potassium Level 4.4 3.5 - 5.1 mmol/L    Chloride 108 (H) 98 - 107 mmol/L    Carbon Dioxide 26 23 - 31 mmol/L    Glucose Level 112 82 - 115 mg/dL    Blood Urea Nitrogen 10.0 8.4 - 25.7 mg/dL    Creatinine 0.78 0.73 - 1.18 mg/dL    Calcium Level Total 9.6 8.8 - 10.0 mg/dL    Protein Total 7.3 5.8 - 7.6 gm/dL    Albumin Level 3.6 3.4 - 4.8 gm/dL    Globulin 3.7 (H) 2.4 - 3.5 gm/dL    Albumin/Globulin Ratio 1.0 (L) 1.1 - 2.0 ratio    Bilirubin Total 0.5 <=1.5 mg/dL    Alkaline Phosphatase 107 40 - 150 unit/L    Alanine Aminotransferase 25 0 - 55 unit/L    Aspartate Aminotransferase 20 5 - 34 unit/L    eGFR >60 mls/min/1.73/m2   CK    Collection Time: 09/20/22  8:21 AM   Result Value Ref Range    Creatine Kinase 70 30 - 200 U/L   Sedimentation rate    Collection Time: 09/20/22  8:21 AM   Result Value Ref Range    Sed Rate 18 (H) 0 - 15 mm/hr   Anti-Neutrophilic Cytoplasmic Antibody    Collection Time: 09/20/22  8:21 AM   Result Value Ref Range    c-ANCA Negative Negative    p-ANCA Positive (A) Negative   CBC with Differential    Collection Time: 09/20/22  8:21 AM   Result Value Ref Range    WBC 8.2 4.5 - 11.5 x10(3)/mcL    RBC 3.93 (L) 4.70 - 6.10 x10(6)/mcL    Hgb 12.9 (L) 14.0 - 18.0 gm/dL    Hct 39.0 (L) 42.0 - 52.0 %    MCV 99.2 (H) 80.0 - 94.0 fL    MCH 32.8 (H) 27.0 - 31.0 pg    MCHC 33.1 33.0 - 36.0 mg/dL    RDW 13.5 11.5 - 17.0 %    Platelet 369 130 - 400 x10(3)/mcL    MPV 9.2 7.4 - 10.4 fL    Neut % 59.2 %    Lymph % 24.7 %    Mono % 8.9 %    Eos % 5.8 %    Basophil % 1.0 %    Lymph # 2.03 0.6 - 4.6 x10(3)/mcL    Neut # 4.9 2.1 - 9.2  x10(3)/mcL    Mono # 0.73 0.1 - 1.3 x10(3)/mcL    Eos # 0.48 0 - 0.9 x10(3)/mcL    Baso # 0.08 0 - 0.2 x10(3)/mcL    IG# 0.03 0 - 0.04 x10(3)/mcL    IG% 0.4 %    NRBC% 0.0 %   Hepatitis C Antibody    Collection Time: 09/29/22  7:30 AM   Result Value Ref Range    Hepatitis C Antibody Nonreactive Nonreactive   HIV 1/2 Ag/Ab (4th Gen)    Collection Time: 09/29/22  7:30 AM   Result Value Ref Range    HIV Nonreactive Nonreactive   Comprehensive Metabolic Panel    Collection Time: 09/29/22  7:30 AM   Result Value Ref Range    Sodium Level 144 136 - 145 mmol/L    Potassium Level 4.4 3.5 - 5.1 mmol/L    Chloride 108 (H) 98 - 107 mmol/L    Carbon Dioxide 25 23 - 31 mmol/L    Glucose Level 113 82 - 115 mg/dL    Blood Urea Nitrogen 12.0 8.4 - 25.7 mg/dL    Creatinine 0.83 0.73 - 1.18 mg/dL    Calcium Level Total 9.6 8.8 - 10.0 mg/dL    Protein Total 7.3 5.8 - 7.6 gm/dL    Albumin Level 3.7 3.4 - 4.8 gm/dL    Globulin 3.6 (H) 2.4 - 3.5 gm/dL    Albumin/Globulin Ratio 1.0 (L) 1.1 - 2.0 ratio    Bilirubin Total 0.4 <=1.5 mg/dL    Alkaline Phosphatase 108 40 - 150 unit/L    Alanine Aminotransferase 30 0 - 55 unit/L    Aspartate Aminotransferase 21 5 - 34 unit/L    eGFR >60 mls/min/1.73/m2   Lipid Panel    Collection Time: 09/29/22  7:30 AM   Result Value Ref Range    Cholesterol Total 148 <=200 mg/dL    HDL Cholesterol 40 35 - 60 mg/dL    Triglyceride 105 34 - 140 mg/dL    Cholesterol/HDL Ratio 4 0 - 5    Very Low Density Lipoprotein 21     LDL Cholesterol 87.00 50.00 - 140.00 mg/dL   PSA, Screening    Collection Time: 09/29/22  7:30 AM   Result Value Ref Range    Prostate Specific Antigen 0.57 <=4.00 ng/mL   Hemoglobin A1C    Collection Time: 09/29/22  7:30 AM   Result Value Ref Range    Hemoglobin A1c 5.4 <=7.0 %    Estimated Average Glucose 108.3 mg/dL   Microalbumin/Creatinine Ratio, Urine    Collection Time: 09/29/22  7:30 AM   Result Value Ref Range    Urine Microalbumin 6.3 <=30.0 ug/ml    Urine Creatinine 141.5 63.0 -  166.0 mg/dL    Microalbumin Creatinine Ratio 4.5 0.0 - 30.0 mg/gm Cr   CBC with Differential    Collection Time: 09/29/22  7:30 AM   Result Value Ref Range    WBC 7.4 4.5 - 11.5 x10(3)/mcL    RBC 3.95 (L) 4.70 - 6.10 x10(6)/mcL    Hgb 13.0 (L) 14.0 - 18.0 gm/dL    Hct 39.3 (L) 42.0 - 52.0 %    MCV 99.5 (H) 80.0 - 94.0 fL    MCH 32.9 (H) 27.0 - 31.0 pg    MCHC 33.1 33.0 - 36.0 mg/dL    RDW 13.3 11.5 - 17.0 %    Platelet 313 130 - 400 x10(3)/mcL    MPV 9.3 7.4 - 10.4 fL    Neut % 56.6 %    Lymph % 27.6 %    Mono % 9.6 %    Eos % 4.7 %    Basophil % 1.2 %    Lymph # 2.04 0.6 - 4.6 x10(3)/mcL    Neut # 4.2 2.1 - 9.2 x10(3)/mcL    Mono # 0.71 0.1 - 1.3 x10(3)/mcL    Eos # 0.35 0 - 0.9 x10(3)/mcL    Baso # 0.09 0 - 0.2 x10(3)/mcL    IG# 0.02 0 - 0.04 x10(3)/mcL    IG% 0.3 %    NRBC% 0.0 %            No flowsheet data found.  Fall Risk Assessment - Outpatient 10/5/2022 5/17/2022   Mobility Status Ambulatory Ambulatory   Number of falls 0 0   Identified as fall risk 0 0           Depression Screening  Over the past two weeks, has the patient felt down, depressed, or hopeless?: No  Over the past two weeks, has the patient felt little interest or pleasure in doing things?: No  Functional Ability/Safety Screening  Was the patient's timed Up & Go test unsteady or longer than 30 seconds?: Yes  Does the patient need help with phone, transportation, shopping, preparing meals, housework, laundry, meds, or managing money?: No  Does the patient's home have rugs in the hallway, lack grab bars in the bathroom, lack handrails on the stairs or have poor lighting?: No  Have you noticed any hearing difficulties?: No  Cognitive Function (Assessed through direct observation with due consideration of information obtained by way of patient reports and/or concerns raised by family, friends, caretakers, or others)    Does the patient repeat questions/statements in the same day?: No  Does the patient have trouble remembering the date, year, and  time?: No  Does the patient have difficulty managing finances?: No  Does the patient have a decreased sense of direction?: No  Assessment/Plan:     1. Wellness examination  Lab work reviewed with patient  Continue current medication  Continue diet/exercise  Advanced directive discussed with patient  Patient defers colonoscopy/FIT test  Return to clinic with any concerns    2. Heart failure, unspecified HF chronicity, unspecified heart failure type  Continue current medication   Keep scheduled appointment with Dr May Medicare Annual Wellness and Personalized Prevention Plan:   Fall Risk + Home Safety + Hearing Impairment + Depression Screen + Opioid and Substance Abuse Screening + Cognitive Impairment Screen + Health Risk Assessment all reviewed.     Health Maintenance Topics with due status: Not Due       Topic Last Completion Date    Lipid Panel 09/29/2022      The patient's Health Maintenance was reviewed and the following appears to be due at this time:   Health Maintenance Due   Topic Date Due    Pneumococcal Vaccines (Age 65+) (1 - PCV) Never done    TETANUS VACCINE  Never done    Shingles Vaccine (1 of 2) Never done    Colorectal Cancer Screening  Never done    COVID-19 Vaccine (4 - Booster for Pfizer series) 12/14/2021       Advance Care Planning   I attest to discussing Advance Care Planning with patient and/or family member.  Education was provided including the importance of the Health Care Power of , Advance Directives, and/or [place] documentation.  The patient expressed understanding to the importance of this information and discussion.         Follow up in about 6 months (around 4/5/2023). In addition to their scheduled follow up, the patient has also been instructed to follow up on as needed basis.

## 2022-10-05 NOTE — PLAN OF CARE
Physical Therapy Treatment Note     Name: Jose De Jesus Oropeza  Clinic Number: [id number]    Therapy Diagnosis:   Encounter Diagnosis   Name Primary?    Left leg weakness Yes     Physician: Jose De Jesus Ignacio MD    Visit Date: 10/5/2022    Physician Orders: PT Eval and Treat  Medical Diagnosis from Referral: s/p left TKA  Evaluation Date: 9/14/2022  Authorization Period Expiration: Medically Necessary  Plan of Care Expiration: 12/14/2022  Visit # / Visits authorized: 9/ As needed    Time In: 1300  Time Out: 1346  Total Billable Time: 46 minutes    Surgery: Left TKA 08/30/2022  Orthopedic Precautions: WBAT LLE  Pertinent History: Hx lumbar surgery    Subjective     Patient reports: No adverse reports. States the left knee feels better and better each day.    Outcome Measure:  Eval: KOS: 31/70 = 44.3% functional  09/28/22: KOS: 55/70 = 78.6% functional    Objective     Jose De Jesus received the following treatment:     Time Activities   Manual     TherAct 46 min NuStep, post capsule stretch (seated with MHP and weights, supine passive), QS, hamstring stretch (with DF/PF), SLR, self stretch (calf board, knee flexion lunge), sit<>stand, step ups (fwd, lat), BOSU step into lunge (fwd, lat)   TherEx  NuStep, SLR, QS, LAQ, HS curl, LLE (low load sustained posterior capsule stretch (passive and seated with weight hang), scar mobilization, posterior knee Biofreeze application, hamstring stretch   Gait     Neuro Re-ed     Modalities     E-Stim     Dry Needling     Canalith Repositioning           Home Exercises Provided and Patient Education Provided     Education provided:   - HEP, plan of care    Assessment     Transitioning to more standing closed chain activities to help build load tolerance. Posterior knee capsule significant less tight, getting to terminal knee extension with no pain but mild stretch. Step ups lessening in difficulty, as is sit<>stand from standard chair height. Posterior capsule also improving flexibility,  significantly less restriction with TKE. Scar healing very well, ~95% closed; following full closure, will begin quad stretches. Scar mobilizing well, mild resistance distally.    Patient prognosis is Excellent.      Anticipated barriers to physical therapy: None    Goals: Jose De Jesus Is progressing well towards his goals.  Short Term Goals: 6 weeks   Patient will report at least 10% increase in functional capacity on KOS to indicate clinically significant functional improvement  Patient will demonstrate left knee ROM 0-110 degrees to allow greater functional capacity  Patient will be able to sit<>stand from low chair height without pain     Long Term Goals: 12 weeks   Patient will report at least 20% increase in functional capacity on KOS to indicate clinically significant functional improvement  Patient will demonstrate independence with HEP to allow home maintenance of functional gains     Plan     2-3x/week x 12 weeks    Corrie Olivera, PT

## 2022-10-07 ENCOUNTER — CLINICAL SUPPORT (OUTPATIENT)
Dept: REHABILITATION | Facility: HOSPITAL | Age: 72
End: 2022-10-07
Payer: MEDICARE

## 2022-10-07 DIAGNOSIS — R29.898 LEFT LEG WEAKNESS: Primary | ICD-10-CM

## 2022-10-07 PROCEDURE — 97530 THERAPEUTIC ACTIVITIES: CPT

## 2022-10-07 NOTE — PLAN OF CARE
Physical Therapy Treatment Note     Name: Jose De Jesus Oropeza  Clinic Number: [id number]    Therapy Diagnosis:   Encounter Diagnosis   Name Primary?    Left leg weakness Yes     Physician: Jose De Jesus Ignacio MD    Visit Date: 10/7/2022    Physician Orders: PT Eval and Treat  Medical Diagnosis from Referral: s/p left TKA  Evaluation Date: 9/14/2022  Authorization Period Expiration: Medically Necessary  Plan of Care Expiration: 12/14/2022  Visit # / Visits authorized: 9/ As needed    Time In: 1010  Time Out: 1055  Total Billable Time: 45 minutes    Surgery: Left TKA 08/30/2022  Orthopedic Precautions: WBAT LLE  Pertinent History: Hx lumbar surgery    Subjective     Patient reports: No adverse reports. States the left knee feels better and better each day. Has follow up with MD on Monday 10/10/22.    Outcome Measure:  Eval: KOS: 31/70 = 44.3% functional  09/28/22: KOS: 55/70 = 78.6% functional    Objective     Jose De Jesus received the following treatment:     Time Activities   Manual     TherAct 45 min QS, SLR, bridge, post capsule stretch (seated with MHP and weights, supine passive), calf board stretch (single leg), calf board for dual hamstring/calf stretch *single leg), knee flexion lunge stretch, free standing squat > glute set hip thrust, sit<>stand, fwd step ups, BOSU step into lunge (fwd, lat), monster walk, HEP   TherEx  NuStep, SLR, QS, LAQ, HS curl, LLE (low load sustained posterior capsule stretch (passive and seated with weight hang), scar mobilization, posterior knee Biofreeze application, hamstring stretch   Gait     Neuro Re-ed     Modalities     E-Stim     Dry Needling     Canalith Repositioning           Home Exercises Provided and Patient Education Provided     Education provided:   - HEP (printed, given, and reviewed; see media section of EMR), plan of care    Assessment     Transitioning to more standing closed chain activities to help build load tolerance. Posterior knee capsule left mildly tight, however  near identical to right. Scar healing very well, fully closed thus implementing deeper knee flexion ROM. He is functionally independent, thus would be comfortable transitioning to fully independent HEP. Emphasis of given HEP was knee flexion and extension ROM, as his functional ADLs serve as an adequate strengthening program. Follows up with MD on Monday 10/10, and will decide whether to continue or D/C PT.    Patient prognosis is Excellent.      Anticipated barriers to physical therapy: None    Goals: Jose De Jesus Is progressing well towards his goals.  Short Term Goals: 6 weeks   Patient will report at least 10% increase in functional capacity on KOS to indicate clinically significant functional improvement  Patient will demonstrate left knee ROM 0-110 degrees to allow greater functional capacity  Patient will be able to sit<>stand from low chair height without pain     Long Term Goals: 12 weeks   Patient will report at least 20% increase in functional capacity on KOS to indicate clinically significant functional improvement  Patient will demonstrate independence with HEP to allow home maintenance of functional gains     Plan     2-3x/week x 12 weeks    Corrie Olivera, PT

## 2023-01-24 ENCOUNTER — LAB VISIT (OUTPATIENT)
Dept: LAB | Facility: HOSPITAL | Age: 73
End: 2023-01-24
Attending: INTERNAL MEDICINE
Payer: MEDICARE

## 2023-01-24 DIAGNOSIS — I77.6 ARTERITIS, UNSPECIFIED: ICD-10-CM

## 2023-01-24 DIAGNOSIS — L95.9 CUTANEOUS VASCULITIS: ICD-10-CM

## 2023-01-24 DIAGNOSIS — Z79.899 ENCOUNTER FOR LONG-TERM (CURRENT) USE OF OTHER MEDICATIONS: Primary | ICD-10-CM

## 2023-01-24 LAB
ALBUMIN SERPL-MCNC: 3.9 G/DL (ref 3.4–4.8)
ALBUMIN/GLOB SERPL: 1.1 RATIO (ref 1.1–2)
ALP SERPL-CCNC: 99 UNIT/L (ref 40–150)
ALT SERPL-CCNC: 25 UNIT/L (ref 0–55)
AST SERPL-CCNC: 22 UNIT/L (ref 5–34)
BASOPHILS # BLD AUTO: 0.06 X10(3)/MCL (ref 0–0.2)
BASOPHILS NFR BLD AUTO: 0.9 %
BILIRUBIN DIRECT+TOT PNL SERPL-MCNC: 0.4 MG/DL
BUN SERPL-MCNC: 17 MG/DL (ref 8.4–25.7)
CALCIUM SERPL-MCNC: 9.7 MG/DL (ref 8.8–10)
CHLORIDE SERPL-SCNC: 107 MMOL/L (ref 98–107)
CK SERPL-CCNC: 148 U/L (ref 30–200)
CO2 SERPL-SCNC: 23 MMOL/L (ref 23–31)
CREAT SERPL-MCNC: 0.99 MG/DL (ref 0.73–1.18)
CRP SERPL-MCNC: 2.6 MG/L
EOSINOPHIL # BLD AUTO: 0.26 X10(3)/MCL (ref 0–0.9)
EOSINOPHIL NFR BLD AUTO: 3.7 %
ERYTHROCYTE [DISTWIDTH] IN BLOOD BY AUTOMATED COUNT: 13.7 % (ref 11.5–17)
ERYTHROCYTE [SEDIMENTATION RATE] IN BLOOD: 10 MM/HR (ref 0–15)
GFR SERPLBLD CREATININE-BSD FMLA CKD-EPI: >60 MLS/MIN/1.73/M2
GLOBULIN SER-MCNC: 3.5 GM/DL (ref 2.4–3.5)
GLUCOSE SERPL-MCNC: 164 MG/DL (ref 82–115)
HCT VFR BLD AUTO: 42.6 % (ref 42–52)
HGB BLD-MCNC: 14.5 GM/DL (ref 14–18)
IMM GRANULOCYTES # BLD AUTO: 0.01 X10(3)/MCL (ref 0–0.04)
IMM GRANULOCYTES NFR BLD AUTO: 0.1 %
LYMPHOCYTES # BLD AUTO: 2.07 X10(3)/MCL (ref 0.6–4.6)
LYMPHOCYTES NFR BLD AUTO: 29.6 %
MCH RBC QN AUTO: 32.1 PG
MCHC RBC AUTO-ENTMCNC: 34 MG/DL (ref 33–36)
MCV RBC AUTO: 94.2 FL (ref 80–94)
MONOCYTES # BLD AUTO: 0.61 X10(3)/MCL (ref 0.1–1.3)
MONOCYTES NFR BLD AUTO: 8.7 %
NEUTROPHILS # BLD AUTO: 3.98 X10(3)/MCL (ref 2.1–9.2)
NEUTROPHILS NFR BLD AUTO: 57 %
NRBC BLD AUTO-RTO: 0 %
PLATELET # BLD AUTO: 262 X10(3)/MCL (ref 130–400)
PMV BLD AUTO: 9.8 FL (ref 7.4–10.4)
POTASSIUM SERPL-SCNC: 4.1 MMOL/L (ref 3.5–5.1)
PROT SERPL-MCNC: 7.4 GM/DL (ref 5.8–7.6)
RBC # BLD AUTO: 4.52 X10(6)/MCL (ref 4.7–6.1)
SODIUM SERPL-SCNC: 140 MMOL/L (ref 136–145)
WBC # SPEC AUTO: 7 X10(3)/MCL (ref 4.5–11.5)

## 2023-01-24 PROCEDURE — 85025 COMPLETE CBC W/AUTO DIFF WBC: CPT

## 2023-01-24 PROCEDURE — 85651 RBC SED RATE NONAUTOMATED: CPT

## 2023-01-24 PROCEDURE — 82550 ASSAY OF CK (CPK): CPT

## 2023-01-24 PROCEDURE — 86140 C-REACTIVE PROTEIN: CPT

## 2023-01-24 PROCEDURE — 36415 COLL VENOUS BLD VENIPUNCTURE: CPT

## 2023-01-24 PROCEDURE — 86036 ANCA SCREEN EACH ANTIBODY: CPT

## 2023-01-24 PROCEDURE — 80053 COMPREHEN METABOLIC PANEL: CPT

## 2023-01-26 LAB
C-ANCA TITR SER IF: NEGATIVE {TITER}
P-ANCA SER QL IF: POSITIVE

## 2023-04-17 ENCOUNTER — OFFICE VISIT (OUTPATIENT)
Dept: FAMILY MEDICINE | Facility: CLINIC | Age: 73
End: 2023-04-17
Payer: MEDICARE

## 2023-04-17 VITALS
TEMPERATURE: 97 F | HEIGHT: 71 IN | HEART RATE: 82 BPM | RESPIRATION RATE: 18 BRPM | OXYGEN SATURATION: 96 % | SYSTOLIC BLOOD PRESSURE: 144 MMHG | DIASTOLIC BLOOD PRESSURE: 66 MMHG | BODY MASS INDEX: 32.34 KG/M2 | WEIGHT: 231 LBS

## 2023-04-17 DIAGNOSIS — Z12.11 COLON CANCER SCREENING: ICD-10-CM

## 2023-04-17 DIAGNOSIS — E66.01 MORBID (SEVERE) OBESITY DUE TO EXCESS CALORIES: ICD-10-CM

## 2023-04-17 DIAGNOSIS — I10 HYPERTENSION, UNSPECIFIED TYPE: Primary | ICD-10-CM

## 2023-04-17 DIAGNOSIS — I50.9 HEART FAILURE, UNSPECIFIED HF CHRONICITY, UNSPECIFIED HEART FAILURE TYPE: ICD-10-CM

## 2023-04-17 DIAGNOSIS — M06.9 RHEUMATOID ARTHRITIS, INVOLVING UNSPECIFIED SITE, UNSPECIFIED WHETHER RHEUMATOID FACTOR PRESENT: ICD-10-CM

## 2023-04-17 DIAGNOSIS — I77.6 ARTERITIS, UNSPECIFIED: ICD-10-CM

## 2023-04-17 PROCEDURE — 99214 PR OFFICE/OUTPT VISIT, EST, LEVL IV, 30-39 MIN: ICD-10-PCS | Mod: ,,, | Performed by: FAMILY MEDICINE

## 2023-04-17 PROCEDURE — 99214 OFFICE O/P EST MOD 30 MIN: CPT | Mod: ,,, | Performed by: FAMILY MEDICINE

## 2023-04-17 NOTE — PROGRESS NOTES
"Subjective:       Patient ID: Jose De Jesus Oropeza is a 72 y.o. male.    Chief Complaint: 6 month follow up      Routine    Hypertension  - tolerating medication (no side effects)  - no headaches  - patient without any complaints    Review of Systems   Constitutional: Negative.    Respiratory: Negative.     Cardiovascular: Negative.    Gastrointestinal: Negative.    Musculoskeletal:         RA: followed by Dr Whitt, no recent flares   Psychiatric/Behavioral: Negative.           Objective:      BP (!) 144/66 (BP Location: Left arm, Patient Position: Sitting, BP Method: Large (Manual))   Pulse 82   Temp 97.1 °F (36.2 °C)   Resp 18   Ht 5' 11" (1.803 m)   Wt 104.8 kg (231 lb)   SpO2 96%   BMI 32.22 kg/m²    Physical Exam  Constitutional:       Appearance: Normal appearance.   Cardiovascular:      Rate and Rhythm: Normal rate and regular rhythm.      Heart sounds: Normal heart sounds.   Pulmonary:      Effort: Pulmonary effort is normal.      Breath sounds: Normal breath sounds.   Neurological:      Mental Status: He is alert.   Psychiatric:         Mood and Affect: Mood normal.         Behavior: Behavior normal.         Thought Content: Thought content normal.         Judgment: Judgment normal.             Assessment:       Problem List Items Addressed This Visit          Cardiac/Vascular    Heart failure, unspecified HF chronicity, unspecified heart failure type    Current Assessment & Plan     Followed by Dr. Salcedo         Hypertension - Primary       Immunology/Multi System    Rheumatoid arthritis, involving unspecified site, unspecified whether rheumatoid factor present    Current Assessment & Plan     Followed by Dr. Whitt         Arteritis, unspecified    Current Assessment & Plan     Followed by Dr. Salcedo            Endocrine    Morbid (severe) obesity due to excess calories    Current Assessment & Plan     Reduced calorie diet modification  Frequent self weighing   Exercise/lifestyle modification      "     Other Visit Diagnoses       Colon cancer screening                     Plan:   1. Hypertension, unspecified type  Controlled  Continue current Rx medication  Return to clinic with any concerns    2. Rheumatoid arthritis, involving unspecified site, unspecified whether rheumatoid factor present  Assessment & Plan:  Followed by Dr. Whitt    3. Arteritis, unspecified  Assessment & Plan:  Followed by Dr. May    4. Morbid (severe) obesity due to excess calories  Assessment & Plan:  Reduced calorie diet modification  Frequent self weighing   Exercise/lifestyle modification    5. Heart failure, unspecified HF chronicity, unspecified heart failure type  Assessment & Plan:  Followed by Dr. Salcedo    6. Colon cancer screening  Discussed colonoscopy/Cologuard; patient adamantly defers both

## 2023-05-22 ENCOUNTER — LAB VISIT (OUTPATIENT)
Dept: LAB | Facility: HOSPITAL | Age: 73
End: 2023-05-22
Attending: INTERNAL MEDICINE
Payer: MEDICARE

## 2023-05-22 DIAGNOSIS — Z79.899 ENCOUNTER FOR LONG-TERM (CURRENT) USE OF OTHER MEDICATIONS: Primary | ICD-10-CM

## 2023-05-22 DIAGNOSIS — L95.9 VASCULITIS LIMITED TO THE SKIN, UNSPECIFIED: ICD-10-CM

## 2023-05-22 DIAGNOSIS — M06.09 RHEUMATOID ARTHRITIS OF MULTIPLE SITES WITH NEGATIVE RHEUMATOID FACTOR: ICD-10-CM

## 2023-05-22 LAB
ALBUMIN SERPL-MCNC: 3.9 G/DL (ref 3.4–4.8)
ALBUMIN/GLOB SERPL: 1.1 RATIO (ref 1.1–2)
ALP SERPL-CCNC: 88 UNIT/L (ref 40–150)
ALT SERPL-CCNC: 27 UNIT/L (ref 0–55)
AST SERPL-CCNC: 23 UNIT/L (ref 5–34)
BASOPHILS # BLD AUTO: 0.05 X10(3)/MCL
BASOPHILS NFR BLD AUTO: 0.7 %
BILIRUBIN DIRECT+TOT PNL SERPL-MCNC: 0.2 MG/DL (ref 0–?)
BILIRUBIN DIRECT+TOT PNL SERPL-MCNC: 0.6 MG/DL
BUN SERPL-MCNC: 17 MG/DL (ref 8.4–25.7)
CALCIUM SERPL-MCNC: 9.8 MG/DL (ref 8.8–10)
CHLORIDE SERPL-SCNC: 107 MMOL/L (ref 98–107)
CK SERPL-CCNC: 126 U/L (ref 30–200)
CO2 SERPL-SCNC: 26 MMOL/L (ref 23–31)
CREAT SERPL-MCNC: 0.83 MG/DL (ref 0.73–1.18)
CRP SERPL-MCNC: 2.5 MG/L
EOSINOPHIL # BLD AUTO: 0.2 X10(3)/MCL (ref 0–0.9)
EOSINOPHIL NFR BLD AUTO: 2.8 %
ERYTHROCYTE [DISTWIDTH] IN BLOOD BY AUTOMATED COUNT: 13.6 % (ref 11.5–17)
ERYTHROCYTE [SEDIMENTATION RATE] IN BLOOD: 9 MM/HR (ref 0–15)
GFR SERPLBLD CREATININE-BSD FMLA CKD-EPI: >60 MLS/MIN/1.73/M2
GLOBULIN SER-MCNC: 3.4 GM/DL (ref 2.4–3.5)
GLUCOSE SERPL-MCNC: 122 MG/DL (ref 82–115)
HCT VFR BLD AUTO: 43 % (ref 42–52)
HGB BLD-MCNC: 14.7 G/DL (ref 14–18)
IMM GRANULOCYTES # BLD AUTO: 0.02 X10(3)/MCL (ref 0–0.04)
IMM GRANULOCYTES NFR BLD AUTO: 0.3 %
LYMPHOCYTES # BLD AUTO: 1.84 X10(3)/MCL (ref 0.6–4.6)
LYMPHOCYTES NFR BLD AUTO: 26.1 %
MCH RBC QN AUTO: 33.3 PG (ref 27–31)
MCHC RBC AUTO-ENTMCNC: 34.2 G/DL (ref 33–36)
MCV RBC AUTO: 97.5 FL (ref 80–94)
MONOCYTES # BLD AUTO: 0.72 X10(3)/MCL (ref 0.1–1.3)
MONOCYTES NFR BLD AUTO: 10.2 %
NEUTROPHILS # BLD AUTO: 4.22 X10(3)/MCL (ref 2.1–9.2)
NEUTROPHILS NFR BLD AUTO: 59.9 %
NRBC BLD AUTO-RTO: 0 %
PHOSPHATE SERPL-MCNC: 2.6 MG/DL (ref 2.3–4.7)
PLATELET # BLD AUTO: 248 X10(3)/MCL (ref 130–400)
PMV BLD AUTO: 9.2 FL (ref 7.4–10.4)
POTASSIUM SERPL-SCNC: 4 MMOL/L (ref 3.5–5.1)
PROT SERPL-MCNC: 7.3 GM/DL (ref 5.8–7.6)
RBC # BLD AUTO: 4.41 X10(6)/MCL (ref 4.7–6.1)
SODIUM SERPL-SCNC: 141 MMOL/L (ref 136–145)
URATE SERPL-MCNC: 8.3 MG/DL (ref 3.5–7.2)
WBC # SPEC AUTO: 7.05 X10(3)/MCL (ref 4.5–11.5)

## 2023-05-22 PROCEDURE — 82550 ASSAY OF CK (CPK): CPT

## 2023-05-22 PROCEDURE — 85651 RBC SED RATE NONAUTOMATED: CPT

## 2023-05-22 PROCEDURE — 86140 C-REACTIVE PROTEIN: CPT

## 2023-05-22 PROCEDURE — 82248 BILIRUBIN DIRECT: CPT

## 2023-05-22 PROCEDURE — 84550 ASSAY OF BLOOD/URIC ACID: CPT

## 2023-05-22 PROCEDURE — 84100 ASSAY OF PHOSPHORUS: CPT

## 2023-05-22 PROCEDURE — 85025 COMPLETE CBC W/AUTO DIFF WBC: CPT

## 2023-05-22 PROCEDURE — 36415 COLL VENOUS BLD VENIPUNCTURE: CPT

## 2023-05-22 PROCEDURE — 80053 COMPREHEN METABOLIC PANEL: CPT

## 2023-08-25 DIAGNOSIS — I25.10 ASCVD (ARTERIOSCLEROTIC CARDIOVASCULAR DISEASE): Primary | ICD-10-CM

## 2023-08-25 RX ORDER — ATORVASTATIN CALCIUM 10 MG/1
10 TABLET, FILM COATED ORAL DAILY
Qty: 90 TABLET | Refills: 3 | Status: SHIPPED | OUTPATIENT
Start: 2023-08-25 | End: 2023-10-26

## 2023-09-06 ENCOUNTER — LAB VISIT (OUTPATIENT)
Dept: LAB | Facility: HOSPITAL | Age: 73
End: 2023-09-06
Attending: INTERNAL MEDICINE
Payer: MEDICARE

## 2023-09-06 DIAGNOSIS — Z79.899 ENCOUNTER FOR LONG-TERM (CURRENT) USE OF OTHER MEDICATIONS: Primary | ICD-10-CM

## 2023-09-06 DIAGNOSIS — L95.9 CUTANEOUS VASCULITIS: ICD-10-CM

## 2023-09-06 DIAGNOSIS — M06.09 RHEUMATOID ARTHRITIS OF MULTIPLE SITES WITHOUT RHEUMATOID FACTOR: ICD-10-CM

## 2023-09-06 LAB
ALBUMIN SERPL-MCNC: 3.7 G/DL (ref 3.4–4.8)
ALBUMIN/GLOB SERPL: 1.1 RATIO (ref 1.1–2)
ALP SERPL-CCNC: 91 UNIT/L (ref 40–150)
ALT SERPL-CCNC: 29 UNIT/L (ref 0–55)
AST SERPL-CCNC: 22 UNIT/L (ref 5–34)
BASOPHILS # BLD AUTO: 0.06 X10(3)/MCL
BASOPHILS NFR BLD AUTO: 1.1 %
BILIRUB SERPL-MCNC: 0.5 MG/DL
BILIRUBIN DIRECT+TOT PNL SERPL-MCNC: 0.2 MG/DL (ref 0–?)
BUN SERPL-MCNC: 13 MG/DL (ref 8.4–25.7)
CALCIUM SERPL-MCNC: 9.5 MG/DL (ref 8.8–10)
CHLORIDE SERPL-SCNC: 110 MMOL/L (ref 98–107)
CK SERPL-CCNC: 78 U/L (ref 30–200)
CO2 SERPL-SCNC: 27 MMOL/L (ref 23–31)
CREAT SERPL-MCNC: 0.96 MG/DL (ref 0.73–1.18)
CRP SERPL-MCNC: 1.4 MG/L
EOSINOPHIL # BLD AUTO: 0.19 X10(3)/MCL (ref 0–0.9)
EOSINOPHIL NFR BLD AUTO: 3.4 %
ERYTHROCYTE [DISTWIDTH] IN BLOOD BY AUTOMATED COUNT: 13.6 % (ref 11.5–17)
ERYTHROCYTE [SEDIMENTATION RATE] IN BLOOD: 12 MM/HR (ref 0–15)
GFR SERPLBLD CREATININE-BSD FMLA CKD-EPI: >60 MLS/MIN/1.73/M2
GLOBULIN SER-MCNC: 3.3 GM/DL (ref 2.4–3.5)
GLUCOSE SERPL-MCNC: 103 MG/DL (ref 82–115)
HCT VFR BLD AUTO: 43.6 % (ref 42–52)
HGB BLD-MCNC: 14.5 G/DL (ref 14–18)
IMM GRANULOCYTES # BLD AUTO: 0.02 X10(3)/MCL (ref 0–0.04)
IMM GRANULOCYTES NFR BLD AUTO: 0.4 %
LYMPHOCYTES # BLD AUTO: 1.74 X10(3)/MCL (ref 0.6–4.6)
LYMPHOCYTES NFR BLD AUTO: 31 %
MCH RBC QN AUTO: 32.6 PG (ref 27–31)
MCHC RBC AUTO-ENTMCNC: 33.3 G/DL (ref 33–36)
MCV RBC AUTO: 98 FL (ref 80–94)
MONOCYTES # BLD AUTO: 0.67 X10(3)/MCL (ref 0.1–1.3)
MONOCYTES NFR BLD AUTO: 11.9 %
NEUTROPHILS # BLD AUTO: 2.94 X10(3)/MCL (ref 2.1–9.2)
NEUTROPHILS NFR BLD AUTO: 52.2 %
NRBC BLD AUTO-RTO: 0 %
PHOSPHATE SERPL-MCNC: 2.6 MG/DL (ref 2.3–4.7)
PLATELET # BLD AUTO: 264 X10(3)/MCL (ref 130–400)
PMV BLD AUTO: 9.7 FL (ref 7.4–10.4)
POTASSIUM SERPL-SCNC: 4.3 MMOL/L (ref 3.5–5.1)
PROT SERPL-MCNC: 7 GM/DL (ref 5.8–7.6)
RBC # BLD AUTO: 4.45 X10(6)/MCL (ref 4.7–6.1)
SODIUM SERPL-SCNC: 144 MMOL/L (ref 136–145)
URATE SERPL-MCNC: 8 MG/DL (ref 3.5–7.2)
WBC # SPEC AUTO: 5.62 X10(3)/MCL (ref 4.5–11.5)

## 2023-09-06 PROCEDURE — 84100 ASSAY OF PHOSPHORUS: CPT

## 2023-09-06 PROCEDURE — 36415 COLL VENOUS BLD VENIPUNCTURE: CPT

## 2023-09-06 PROCEDURE — 82550 ASSAY OF CK (CPK): CPT

## 2023-09-06 PROCEDURE — 85025 COMPLETE CBC W/AUTO DIFF WBC: CPT

## 2023-09-06 PROCEDURE — 86140 C-REACTIVE PROTEIN: CPT

## 2023-09-06 PROCEDURE — 85652 RBC SED RATE AUTOMATED: CPT

## 2023-09-06 PROCEDURE — 80053 COMPREHEN METABOLIC PANEL: CPT

## 2023-09-06 PROCEDURE — 82248 BILIRUBIN DIRECT: CPT

## 2023-09-06 PROCEDURE — 84550 ASSAY OF BLOOD/URIC ACID: CPT

## 2023-10-17 DIAGNOSIS — E78.5 HYPERLIPIDEMIA, UNSPECIFIED HYPERLIPIDEMIA TYPE: ICD-10-CM

## 2023-10-17 DIAGNOSIS — Z11.4 ENCOUNTER FOR HIV (HUMAN IMMUNODEFICIENCY VIRUS) TEST: Primary | ICD-10-CM

## 2023-10-17 DIAGNOSIS — Z12.5 SCREENING FOR MALIGNANT NEOPLASM OF PROSTATE: ICD-10-CM

## 2023-10-17 DIAGNOSIS — Z13.6 SCREENING FOR ISCHEMIC HEART DISEASE: ICD-10-CM

## 2023-10-17 DIAGNOSIS — I10 HYPERTENSION, UNSPECIFIED TYPE: ICD-10-CM

## 2023-10-17 DIAGNOSIS — Z00.00 WELLNESS EXAMINATION: ICD-10-CM

## 2023-10-17 DIAGNOSIS — E03.9 HYPOTHYROIDISM, UNSPECIFIED TYPE: ICD-10-CM

## 2023-10-17 DIAGNOSIS — Z11.59 NEED FOR HEPATITIS C SCREENING TEST: ICD-10-CM

## 2023-10-18 ENCOUNTER — LAB VISIT (OUTPATIENT)
Dept: LAB | Facility: HOSPITAL | Age: 73
End: 2023-10-18
Attending: FAMILY MEDICINE
Payer: MEDICARE

## 2023-10-18 DIAGNOSIS — Z12.5 SCREENING FOR MALIGNANT NEOPLASM OF PROSTATE: ICD-10-CM

## 2023-10-18 DIAGNOSIS — Z13.6 SCREENING FOR ISCHEMIC HEART DISEASE: ICD-10-CM

## 2023-10-18 DIAGNOSIS — I10 HYPERTENSION, UNSPECIFIED TYPE: ICD-10-CM

## 2023-10-18 DIAGNOSIS — Z00.00 WELLNESS EXAMINATION: ICD-10-CM

## 2023-10-18 DIAGNOSIS — Z11.4 ENCOUNTER FOR HIV (HUMAN IMMUNODEFICIENCY VIRUS) TEST: ICD-10-CM

## 2023-10-18 DIAGNOSIS — E78.5 HYPERLIPIDEMIA, UNSPECIFIED HYPERLIPIDEMIA TYPE: ICD-10-CM

## 2023-10-18 DIAGNOSIS — Z11.59 NEED FOR HEPATITIS C SCREENING TEST: ICD-10-CM

## 2023-10-18 LAB
ALBUMIN SERPL-MCNC: 3.6 G/DL (ref 3.4–4.8)
ALBUMIN/GLOB SERPL: 1.2 RATIO (ref 1.1–2)
ALP SERPL-CCNC: 89 UNIT/L (ref 40–150)
ALT SERPL-CCNC: 25 UNIT/L (ref 0–55)
AST SERPL-CCNC: 20 UNIT/L (ref 5–34)
BASOPHILS # BLD AUTO: 0.06 X10(3)/MCL
BASOPHILS NFR BLD AUTO: 0.9 %
BILIRUB SERPL-MCNC: 0.4 MG/DL
BUN SERPL-MCNC: 11 MG/DL (ref 8.4–25.7)
CALCIUM SERPL-MCNC: 9 MG/DL (ref 8.8–10)
CHLORIDE SERPL-SCNC: 109 MMOL/L (ref 98–107)
CHOLEST SERPL-MCNC: 152 MG/DL
CHOLEST/HDLC SERPL: 4 {RATIO} (ref 0–5)
CO2 SERPL-SCNC: 28 MMOL/L (ref 23–31)
CREAT SERPL-MCNC: 0.85 MG/DL (ref 0.73–1.18)
EOSINOPHIL # BLD AUTO: 0.2 X10(3)/MCL (ref 0–0.9)
EOSINOPHIL NFR BLD AUTO: 3.1 %
ERYTHROCYTE [DISTWIDTH] IN BLOOD BY AUTOMATED COUNT: 13.4 % (ref 11.5–17)
GFR SERPLBLD CREATININE-BSD FMLA CKD-EPI: >60 MLS/MIN/1.73/M2
GLOBULIN SER-MCNC: 3 GM/DL (ref 2.4–3.5)
GLUCOSE SERPL-MCNC: 93 MG/DL (ref 82–115)
HCT VFR BLD AUTO: 43 % (ref 42–52)
HCV AB SERPL QL IA: NONREACTIVE
HDLC SERPL-MCNC: 40 MG/DL (ref 35–60)
HGB BLD-MCNC: 14.5 G/DL (ref 14–18)
HIV 1+2 AB+HIV1 P24 AG SERPL QL IA: NONREACTIVE
IMM GRANULOCYTES # BLD AUTO: 0.01 X10(3)/MCL (ref 0–0.04)
IMM GRANULOCYTES NFR BLD AUTO: 0.2 %
LDLC SERPL CALC-MCNC: 87 MG/DL (ref 50–140)
LYMPHOCYTES # BLD AUTO: 2.16 X10(3)/MCL (ref 0.6–4.6)
LYMPHOCYTES NFR BLD AUTO: 33.2 %
MCH RBC QN AUTO: 33.3 PG (ref 27–31)
MCHC RBC AUTO-ENTMCNC: 33.7 G/DL (ref 33–36)
MCV RBC AUTO: 98.9 FL (ref 80–94)
MONOCYTES # BLD AUTO: 0.81 X10(3)/MCL (ref 0.1–1.3)
MONOCYTES NFR BLD AUTO: 12.5 %
NEUTROPHILS # BLD AUTO: 3.26 X10(3)/MCL (ref 2.1–9.2)
NEUTROPHILS NFR BLD AUTO: 50.1 %
NRBC BLD AUTO-RTO: 0 %
PLATELET # BLD AUTO: 231 X10(3)/MCL (ref 130–400)
PMV BLD AUTO: 9.4 FL (ref 7.4–10.4)
POTASSIUM SERPL-SCNC: 4.3 MMOL/L (ref 3.5–5.1)
PROT SERPL-MCNC: 6.6 GM/DL (ref 5.8–7.6)
PSA SERPL-MCNC: 0.54 NG/ML
RBC # BLD AUTO: 4.35 X10(6)/MCL (ref 4.7–6.1)
SODIUM SERPL-SCNC: 143 MMOL/L (ref 136–145)
TRIGL SERPL-MCNC: 127 MG/DL (ref 34–140)
VLDLC SERPL CALC-MCNC: 25 MG/DL
WBC # SPEC AUTO: 6.5 X10(3)/MCL (ref 4.5–11.5)

## 2023-10-18 PROCEDURE — 80061 LIPID PANEL: CPT

## 2023-10-18 PROCEDURE — 86803 HEPATITIS C AB TEST: CPT

## 2023-10-18 PROCEDURE — 87389 HIV-1 AG W/HIV-1&-2 AB AG IA: CPT

## 2023-10-18 PROCEDURE — 84153 ASSAY OF PSA TOTAL: CPT

## 2023-10-18 PROCEDURE — 36415 COLL VENOUS BLD VENIPUNCTURE: CPT

## 2023-10-18 PROCEDURE — 80053 COMPREHEN METABOLIC PANEL: CPT

## 2023-10-18 PROCEDURE — 85025 COMPLETE CBC W/AUTO DIFF WBC: CPT

## 2023-10-26 ENCOUNTER — OFFICE VISIT (OUTPATIENT)
Dept: FAMILY MEDICINE | Facility: CLINIC | Age: 73
End: 2023-10-26
Payer: MEDICARE

## 2023-10-26 VITALS
DIASTOLIC BLOOD PRESSURE: 64 MMHG | OXYGEN SATURATION: 98 % | RESPIRATION RATE: 18 BRPM | SYSTOLIC BLOOD PRESSURE: 124 MMHG | BODY MASS INDEX: 32.62 KG/M2 | TEMPERATURE: 98 F | WEIGHT: 233 LBS | HEIGHT: 71 IN | HEART RATE: 74 BPM

## 2023-10-26 DIAGNOSIS — Z00.00 WELLNESS EXAMINATION: Primary | ICD-10-CM

## 2023-10-26 DIAGNOSIS — D84.821 DRUG-INDUCED IMMUNODEFICIENCY: ICD-10-CM

## 2023-10-26 DIAGNOSIS — Z79.899 DRUG-INDUCED IMMUNODEFICIENCY: ICD-10-CM

## 2023-10-26 PROCEDURE — G0439 PR MEDICARE ANNUAL WELLNESS SUBSEQUENT VISIT: ICD-10-PCS | Mod: ,,, | Performed by: FAMILY MEDICINE

## 2023-10-26 PROCEDURE — G0439 PPPS, SUBSEQ VISIT: HCPCS | Mod: ,,, | Performed by: FAMILY MEDICINE

## 2023-10-26 NOTE — PROGRESS NOTES
Patient ID: [id number]     Chief Complaint: wellness      HPI:     Jose De Jesus Oropeza is a 73 y.o. male here today for a Medicare Wellness.       Opioid Screening: Patient medication list reviewed, patient is not taking prescription opioids. Patient is not using additional opioids than prescribed. Patient is not at low risk of substance abuse based on this opioid use history.       ----------------------------  Arthritis  CHF (congestive heart failure)  Coronary artery disease  Hypertension  Hypothyroidism  Sleep apnea, unspecified  Spondylosis     Past Surgical History:   Procedure Laterality Date    discemtomy         Review of patient's allergies indicates:  No Known Allergies    Outpatient Medications Marked as Taking for the 10/26/23 encounter (Office Visit) with Babak Grajeda MD   Medication Sig Dispense Refill    amLODIPine (NORVASC) 5 MG tablet Take 5 mg by mouth once daily.      valsartan (DIOVAN) 320 MG tablet Take 320 mg by mouth.         Social History     Socioeconomic History    Marital status:    Tobacco Use    Smoking status: Never    Smokeless tobacco: Never   Substance and Sexual Activity    Alcohol use: Not Currently    Drug use: Never     Social Determinants of Health     Financial Resource Strain: Low Risk  (4/17/2023)    Overall Financial Resource Strain (CARDIA)     Difficulty of Paying Living Expenses: Not hard at all   Food Insecurity: No Food Insecurity (4/17/2023)    Hunger Vital Sign     Worried About Running Out of Food in the Last Year: Never true     Ran Out of Food in the Last Year: Never true   Transportation Needs: No Transportation Needs (4/17/2023)    PRAPARE - Transportation     Lack of Transportation (Medical): No     Lack of Transportation (Non-Medical): No   Physical Activity: Sufficiently Active (4/17/2023)    Exercise Vital Sign     Days of Exercise per Week: 7 days     Minutes of Exercise per Session: 60 min   Stress: No Stress Concern Present (4/17/2023)     [facility] [place] of Occupational Health - Occupational Stress Questionnaire     Feeling of Stress : Not at all   Social Connections: Socially Integrated (4/17/2023)    Social Connection and Isolation Panel [NHANES]     Frequency of Communication with Friends and Family: Three times a week     Frequency of Social Gatherings with Friends and Family: Twice a week     Attends Temple Services: 1 to 4 times per year     Active Member of Clubs or Organizations: Yes     Attends Club or Organization Meetings: 1 to 4 times per year     Marital Status:    Housing Stability: Unknown (4/17/2023)    Housing Stability Vital Sign     Unable to Pay for Housing in the Last Year: No     Unstable Housing in the Last Year: No        History reviewed. No pertinent family history.     Patient Care Team:  Babak Grajeda MD as PCP - General (Family Medicine)  Babak Grajeda MD Meche, Jedediah D, FNP as Nurse Practitioner (Internal Medicine)  Primo Salcedo MD as Consulting Physician (Cardiology)  Niko Martin MD as Consulting Physician (Urology)  Ashok Whitt MD as Consulting Physician (Rheumatology)  Jason Rowe MD (Dermatology)       Subjective:     Review of Systems   Constitutional: Negative.    Respiratory: Negative.     Cardiovascular: Negative.    Gastrointestinal: Negative.    Psychiatric/Behavioral: Negative.           Patient Reported Health Risk Assessment  [age] 70-79  Are you male or female?: Male  During the past four weeks, how much have you been bothered by emotional problems such as feeling anxious, depressed, irritable, sad, or downhearted and blue?: Not at all  During the past five weeks, has your physical and/or emotional health limited your social activities with family, friends, neighbors, or groups?: Not at all  During the past four weeks, how much bodily pain have you generally had?: No pain  During the past four weeks, was someone available to help if you  needed and wanted help?: Yes, a little  During the past four weeks, what was the hardest physical activity you could do for at least two minutes?: Moderate  Can you get to places out of walking distance without help?  (For example, can you travel alone on buses or taxis, or drive your own car?): Yes  Can you go shopping for groceries or clothes without someone's help?: Yes  Can you prepare your own meals?: Yes  Can you do your own housework without help?: Yes  Because of any health problems, do you need the help of another person with your personal care needs such as eating, bathing, dressing, or getting around the house?: No  Can you handle your own money without help?: Yes  During the past four weeks, how would you rate your health in general?: Good  How have things been going for you during the past four weeks?: Good and bad parts about equal  Are you having difficulties driving your car?: No  Do you always fasten your seat belt when you are in a car?: Yes, sometimes  How often in the past four weeks have you been bothered by falling or dizzy when standing up?: Never  How often in the past four weeks have you been bothered by sexual problems?: Never  How often in the past four weeks have you been bothered by trouble eating well?: Never  How often in the past four weeks have you been bothered by teeth or denture problems?: Never  How often in the past four weeks have you been bothered with problems using the telephone?: Never  How often in the past four weeks have you been bothered by tiredness or fatigue?: Never  Have you fallen two or more times in the past year?: No  Are you afraid of falling?: No  Are you a smoker?: No  During the past four weeks, how many drinks of wine, beer, or other alcoholic beverages did you have?: No alcohol at all  Do you exercise for about 20 minutes three or more days a week?: Yes, most of the time  Have you been given any information to help you with hazards in your house that might  "hurt you?: Yes  Have you been given any information to help you with keeping track of your medications?: Yes  How often do you have trouble taking medicines the way you've been told to take them?: I always take them as prescribed  How confident are you that you can control and manage most of your health problems?: Very confident  What is your race? (Check all that apply.):     Objective:     /64 (BP Location: Left arm, Patient Position: Sitting, BP Method: Large (Manual))   Pulse 74   Temp 97.5 °F (36.4 °C)   Resp 18   Ht 5' 11" (1.803 m)   Wt 105.7 kg (233 lb)   SpO2 98%   BMI 32.50 kg/m²     Physical Exam  Constitutional:       Appearance: Normal appearance.   Cardiovascular:      Rate and Rhythm: Normal rate and regular rhythm.   Pulmonary:      Effort: Pulmonary effort is normal.      Breath sounds: Normal breath sounds.   Abdominal:      General: Abdomen is flat. Bowel sounds are normal.      Palpations: Abdomen is soft.   Neurological:      Mental Status: He is alert.   Psychiatric:         Mood and Affect: Mood normal.         Behavior: Behavior normal.         Thought Content: Thought content normal.         Judgment: Judgment normal.                No data to display                  10/26/2023     9:00 AM 4/17/2023     2:00 PM 10/5/2022    10:15 AM 5/17/2022     9:30 AM   Fall Risk Assessment - Outpatient   Mobility Status Ambulatory Ambulatory Ambulatory Ambulatory   Number of falls 0 0 0 0   Identified as fall risk False False False False           Depression Screening  Over the past two weeks, has the patient felt down, depressed, or hopeless?: No  Over the past two weeks, has the patient felt little interest or pleasure in doing things?: No  Functional Ability/Safety Screening  Was the patient's timed Up & Go test unsteady or longer than 30 seconds?: No  Does the patient need help with phone, transportation, shopping, preparing meals, housework, laundry, meds, or managing money?: " "No  Does the patient's home have rugs in the hallway, lack grab bars in the bathroom, lack handrails on the stairs or have poor lighting?: No  Have you noticed any hearing difficulties?: No  A "Yes" response to any of the above questions should trigger further investigation.: n  Cognitive Function (Assessed through direct observation with due consideration of information obtained by way of patient reports and/or concerns raised by family, friends, caretakers, or others)    Does the patient repeat questions/statements in the same day?: No  Does the patient have trouble remembering the date, year, and time?: No  Does the patient have difficulty managing finances?: No  Does the patient have a decreased sense of direction?: No  A "Yes" response to any of the above questions could indicate mild cognitive impairment and should trigger further investigation.: n  Assessment/Plan:     1. Wellness examination  Lab work reviewed with patient  Continue current medication  Continue diet/exercise  Advanced directive discussed with patient  Return to clinic with any concerns    Advance Care Planning     Date: 10/26/2023    Living Will  During this visit, I engaged the patient  in the voluntary advance care planning process.  The patient and I reviewed the role for advance directives and their purpose in directing future healthcare if the patient's unable to speak for him/herself.  At this point in time, the patient does have full decision-making capacity.  We discussed different extreme health states that he could experience, and reviewed what kind of medical care he would want in those situations.  The patient communicated that if he were comatose and had little chance of a meaningful recovery, he would not want machines/life-sustaining treatments used. I spent a total of 5 minutes engaging the patient in this advance care planning discussion.     2. Drug-induced immunodeficiency  Assessment & Plan:  Current medications reviewed " (methotrexate)  Followed by Dr Whitt      Medicare Annual Wellness and Personalized Prevention Plan:   Fall Risk + Home Safety + Hearing Impairment + Depression Screen + Opioid and Substance Abuse Screening + Cognitive Impairment Screen + Health Risk Assessment all reviewed.     Health Maintenance Topics with due status: Not Due       Topic Last Completion Date    Lipid Panel 10/18/2023      The patient's Health Maintenance was reviewed and the following appears to be due at this time:   Health Maintenance Due   Topic Date Due    Pneumococcal Vaccines (Age 65+) (1 - PCV) Never done    TETANUS VACCINE  Never done    Shingles Vaccine (1 of 2) Never done    Influenza Vaccine (1) Never done    COVID-19 Vaccine (4 - 2023-24 season) 09/01/2023       Advance Care Planning   I attest to discussing Advance Care Planning with patient and/or family member.  Education was provided including the importance of the Health Care Power of , Advance Directives, and/or [place] documentation.  The patient expressed understanding to the importance of this information and discussion.         Follow up in about 6 months (around 4/26/2024). In addition to their scheduled follow up, the patient has also been instructed to follow up on as needed basis.

## 2024-01-11 ENCOUNTER — HOSPITAL ENCOUNTER (EMERGENCY)
Facility: HOSPITAL | Age: 74
Discharge: HOME | End: 2024-01-11
Attending: STUDENT IN AN ORGANIZED HEALTH CARE EDUCATION/TRAINING PROGRAM
Payer: MEDICARE

## 2024-01-11 VITALS
DIASTOLIC BLOOD PRESSURE: 87 MMHG | RESPIRATION RATE: 18 BRPM | OXYGEN SATURATION: 93 % | TEMPERATURE: 98 F | BODY MASS INDEX: 32.9 KG/M2 | WEIGHT: 235 LBS | SYSTOLIC BLOOD PRESSURE: 169 MMHG | HEIGHT: 71 IN | HEART RATE: 71 BPM

## 2024-01-11 DIAGNOSIS — R52 PAIN: ICD-10-CM

## 2024-01-11 DIAGNOSIS — K42.9 UMBILICAL HERNIA WITHOUT OBSTRUCTION AND WITHOUT GANGRENE: Primary | ICD-10-CM

## 2024-01-11 LAB
ANION GAP SERPL CALC-SCNC: 9 MEQ/L
BASOPHILS # BLD AUTO: 0.07 X10(3)/MCL
BASOPHILS NFR BLD AUTO: 0.9 %
BUN SERPL-MCNC: 15 MG/DL (ref 8.4–25.7)
CALCIUM SERPL-MCNC: 9.6 MG/DL (ref 8.8–10)
CHLORIDE SERPL-SCNC: 107 MMOL/L (ref 98–107)
CO2 SERPL-SCNC: 25 MMOL/L (ref 23–31)
CREAT SERPL-MCNC: 0.94 MG/DL (ref 0.73–1.18)
CREAT/UREA NIT SERPL: 16
EOSINOPHIL # BLD AUTO: 0.13 X10(3)/MCL (ref 0–0.9)
EOSINOPHIL NFR BLD AUTO: 1.6 %
ERYTHROCYTE [DISTWIDTH] IN BLOOD BY AUTOMATED COUNT: 13.1 % (ref 11.5–17)
GFR SERPLBLD CREATININE-BSD FMLA CKD-EPI: >60 MLS/MIN/1.73/M2
GLUCOSE SERPL-MCNC: 114 MG/DL (ref 82–115)
HCT VFR BLD AUTO: 41.9 % (ref 42–52)
HGB BLD-MCNC: 14.3 G/DL (ref 14–18)
IMM GRANULOCYTES # BLD AUTO: 0.03 X10(3)/MCL (ref 0–0.04)
IMM GRANULOCYTES NFR BLD AUTO: 0.4 %
LACTATE SERPL-SCNC: 1.8 MMOL/L (ref 0.5–2.2)
LYMPHOCYTES # BLD AUTO: 2 X10(3)/MCL (ref 0.6–4.6)
LYMPHOCYTES NFR BLD AUTO: 24.8 %
MCH RBC QN AUTO: 33.3 PG (ref 27–31)
MCHC RBC AUTO-ENTMCNC: 34.1 G/DL (ref 33–36)
MCV RBC AUTO: 97.7 FL (ref 80–94)
MONOCYTES # BLD AUTO: 0.43 X10(3)/MCL (ref 0.1–1.3)
MONOCYTES NFR BLD AUTO: 5.3 %
NEUTROPHILS # BLD AUTO: 5.4 X10(3)/MCL (ref 2.1–9.2)
NEUTROPHILS NFR BLD AUTO: 67 %
NRBC BLD AUTO-RTO: 0 %
PLATELET # BLD AUTO: 237 X10(3)/MCL (ref 130–400)
PMV BLD AUTO: 9.2 FL (ref 7.4–10.4)
POTASSIUM SERPL-SCNC: 4.5 MMOL/L (ref 3.5–5.1)
RBC # BLD AUTO: 4.29 X10(6)/MCL (ref 4.7–6.1)
SODIUM SERPL-SCNC: 141 MMOL/L (ref 136–145)
WBC # SPEC AUTO: 8.06 X10(3)/MCL (ref 4.5–11.5)

## 2024-01-11 PROCEDURE — 99285 EMERGENCY DEPT VISIT HI MDM: CPT | Mod: 25

## 2024-01-11 PROCEDURE — 83605 ASSAY OF LACTIC ACID: CPT | Performed by: STUDENT IN AN ORGANIZED HEALTH CARE EDUCATION/TRAINING PROGRAM

## 2024-01-11 PROCEDURE — 80048 BASIC METABOLIC PNL TOTAL CA: CPT | Performed by: STUDENT IN AN ORGANIZED HEALTH CARE EDUCATION/TRAINING PROGRAM

## 2024-01-11 PROCEDURE — 85025 COMPLETE CBC W/AUTO DIFF WBC: CPT | Performed by: STUDENT IN AN ORGANIZED HEALTH CARE EDUCATION/TRAINING PROGRAM

## 2024-01-11 NOTE — ED PROVIDER NOTES
"Encounter Date: 1/11/2024       History     Chief Complaint   Patient presents with    umblical swelling     Umbilical swelling this am.  Denies any pain or discomfort.       Patient is a 73-year-old white gentleman with a history of hypertension who presented to the ER today due to umbilical deformity.  He states that he normally has a inward protrusion of his umbilicus.  He states that today upon awakening noticed that it was "flat. "He denies any notable pain.  Last bowel movement was last night which was normal brown in color with no hematochezia or melena.  He denies any fever, chills, chest pain shortness of breath.      Review of patient's allergies indicates:  No Known Allergies  Past Medical History:   Diagnosis Date    Arthritis     CHF (congestive heart failure)     Coronary artery disease     Hypertension     Hypothyroidism     Sleep apnea, unspecified     Spondylosis      Past Surgical History:   Procedure Laterality Date    discemtomy       History reviewed. No pertinent family history.  Social History     Tobacco Use    Smoking status: Never    Smokeless tobacco: Never   Substance Use Topics    Alcohol use: Not Currently    Drug use: Never     Review of Systems   Constitutional:  Negative for chills, fatigue and fever.   HENT:  Negative for congestion, sore throat and trouble swallowing.    Eyes:  Negative for pain and visual disturbance.   Respiratory:  Negative for cough, shortness of breath and wheezing.    Cardiovascular:  Negative for chest pain and palpitations.   Gastrointestinal:  Negative for abdominal pain, blood in stool, constipation, diarrhea, nausea and vomiting.   Genitourinary:  Negative for dysuria and hematuria.   Musculoskeletal:  Negative for back pain and myalgias.   Skin:  Negative for rash and wound.   Neurological:  Negative for seizures, syncope and headaches.   Psychiatric/Behavioral:  Negative for confusion. The patient is not nervous/anxious.        Physical Exam "     Initial Vitals [01/11/24 1136]   BP Pulse Resp Temp SpO2   (!) 169/87 78 20 97.9 °F (36.6 °C) 95 %      MAP       --         Physical Exam    Nursing note and vitals reviewed.  Constitutional: He appears well-developed and well-nourished. No distress.   HENT:   Head: Normocephalic and atraumatic.   Eyes: Conjunctivae and EOM are normal. Right eye exhibits no discharge. Left eye exhibits no discharge. No scleral icterus.   Neck: No tracheal deviation present.   Normal range of motion.  Cardiovascular:  Normal rate, regular rhythm and normal heart sounds.     Exam reveals no gallop and no friction rub.       No murmur heard.  Pulmonary/Chest: Breath sounds normal. No respiratory distress. He has no wheezes. He has no rhonchi. He has no rales.   Abdominal: Abdomen is soft. He exhibits no distension. There is no abdominal tenderness.   Flat appearing umbilicus.  Abdomen is soft with no rebound tenderness or guarding.  No clinical signs of peritonitis or acute surgical abdomen.  There appears to be a bowel loops present at the superior border of the umbilicus which was slightly tender with deep palpation.  It was able to be reduced however it protruding forward to the abdominal wall upon sitting up.  It was reduced again however. There is no rebound and no guarding.   Musculoskeletal:         General: No edema. Normal range of motion.      Cervical back: Normal range of motion.     Neurological: He is alert.   Skin: Skin is warm and dry. No rash and no abscess noted. No erythema. No pallor.   Psychiatric: His behavior is normal. Judgment normal.         ED Course   Procedures  Labs Reviewed   CBC WITH DIFFERENTIAL - Abnormal; Notable for the following components:       Result Value    RBC 4.29 (*)     Hct 41.9 (*)     MCV 97.7 (*)     MCH 33.3 (*)     All other components within normal limits   LACTIC ACID, PLASMA - Normal   CBC W/ AUTO DIFFERENTIAL    Narrative:     The following orders were created for panel order  CBC Auto Differential.  Procedure                               Abnormality         Status                     ---------                               -----------         ------                     CBC with Differential[[id number]]       Abnormal            Final result                 Please view results for these tests on the individual orders.   BASIC METABOLIC PANEL          Imaging Results              CT Abdomen Pelvis  Without Contrast (Final result)  Result time 01/11/24 13:41:45      Final result by Matthew Higginbotham MD (01/11/24 13:41:45)                   Impression:      Small umbilical hernia containing some fat.  No obstruction is seen.      Electronically signed by: William Higginbotham  Date:    01/11/2024  Time:    13:41               Narrative:    EXAMINATION:  CT ABDOMEN PELVIS WITHOUT CONTRAST    CLINICAL HISTORY:  umbilical hernia, rule out obstruction;    TECHNIQUE:  Low dose axial images, sagittal and coronal reformations were obtained from the lung bases to the pubic symphysis.  No contrast was administered.  Automatic exposure control is utilized to reduce patient radiation exposure.    COMPARISON:  None    FINDINGS:  The lung bases are clear.    The liver appears normal.  No obvious liver mass or lesion is seen.    Gallbladder appears normal.  No gallstones are seen.    The pancreas appears normal.  No inflammatory changes are seen in the pancreatic region.    The spleen appears normal and adrenal glands appear normal.  No adrenal nodule is seen.    No nephrolithiasis is seen.  No hydronephrosis is seen.  No hydroureter is seen.  No ureteral stone is seen.    No colitis is seen.  No diverticulitis is seen.  No appendicitis is seen.    There is a very small umbilical hernia seen containing some fat.  No obstruction is seen.  No bowel loops are seen in the hernia.    No free air seen.  No free fluid is seen.  The urinary bladder appears normal.    Bones show no acute abnormality.  Postsurgical  changes are seen in the lower lumbar spine.                                       X-Ray Abdomen AP 1 View (KUB) (Final result)  Result time 01/11/24 12:45:07      Final result by Paul Gamez MD (01/11/24 12:45:07)                   Impression:      No acute process is identified..    Tiny calcification as above      Electronically signed by: Paul Gamez  Date:    01/11/2024  Time:    12:45               Narrative:    EXAMINATION:  XR ABDOMEN AP 1 VIEW    CLINICAL HISTORY:  , Pain, unspecified.    FINDINGS:  Examination reveals some residual feces throughout the colon the gas pattern is nonspecific with no clear evidence of ileus or obstruction no abnormal masses identified.    Tiny calcification is identified on the left might be related to the kidney                                       Medications - No data to display  Medical Decision Making  Differentials:  Umbilical hernia, incarcerated hernia, fat hernia, other intra-abdominal abnormality   Overall well-appearing 73-year-old male presents to the ER today with what appears to be an umbilical hernia.  Patient was put in Trendelenburg and the hernia was reduced.  Pain subsided.  X-ray showed no evidence of obstruction however when patient is sat up the hernia seemed to protrude again.  Basic labs and lactic acid are reassuring.  A CT scan was done to confirm after reduction if bowel loop was still present which showed only fat contents.  These results were discussed with the patient after discussion with him he would like to see General surgery for evaluation.  Referral was placed.  All questions were answered in layman's terms and ER return precautions were discussed.      Amount and/or Complexity of Data Reviewed  Labs: ordered. Decision-making details documented in ED Course.  Radiology: ordered. Decision-making details documented in ED Course.                                      Clinical Impression:  Final diagnoses:  [R52] Pain  [K42.9]  Umbilical hernia without obstruction and without gangrene (Primary)          ED Disposition Condition    Discharge Stable          ED Prescriptions    None       Follow-up Information       Follow up With Specialties Details Why Contact Info    [facility] - Emergency Dept Emergency Medicine  If symptoms worsen [address] [facility] [address]  [phone]    Babak Grajeda MD Family Medicine Schedule an appointment as soon as possible for a visit   [address] [address]  [phone]               Wilmar Cisneros MD  01/11/24 8280

## 2024-01-11 NOTE — ED NOTES
Patient ambulated to ER room 4 with steady gait.  Reports that he woke up with belly button pain rated a 7/10 on pain scale. Upon visualization area around belly button is reddened.

## 2024-01-22 ENCOUNTER — ANESTHESIA EVENT (OUTPATIENT)
Dept: SURGERY | Facility: HOSPITAL | Age: 74
End: 2024-01-22
Payer: MEDICARE

## 2024-01-23 NOTE — ANESTHESIA PREPROCEDURE EVALUATION
01/23/2024  Jose De Jesus Oropeza is a 73 y.o., male.      Pre-op Assessment    I have reviewed the Patient Summary Reports.     I have reviewed the Nursing Notes. I have reviewed the NPO Status.   I have reviewed the Medications.     Review of Systems  Anesthesia Hx:  No problems with previous Anesthesia             Denies Family Hx of Anesthesia complications.    Denies Personal Hx of Anesthesia complications.                    Social:  Non-Smoker       Cardiovascular:     Hypertension   CAD       CHF                                 Pulmonary:        Sleep Apnea                Renal/:  Renal/ Normal                 Hepatic/GI:  Hepatic/GI Normal                 Musculoskeletal:  Arthritis               Neurological:  Neurology Normal                                      Endocrine:    Hyperthyroidism       Obesity / BMI > 30  Psych:  Psychiatric Normal                    Physical Exam  General: Well nourished, Cooperative, Alert and Oriented    Airway:  Mallampati: II / II  Mouth Opening: Normal  TM Distance: Normal  Tongue: Normal  Neck ROM: Normal ROM    Dental:  Intact    Chest/Lungs:  Normal Respiratory Rate    Heart:  Rate: Normal    Musculoskeletal:  Normal mobility      Anesthesia Plan  Type of Anesthesia, risks & benefits discussed:    Anesthesia Type: Gen Supraglottic Airway  Intra-op Monitoring Plan: Standard ASA Monitors  Post Op Pain Control Plan: multimodal analgesia  Induction:  IV  Informed Consent: Informed consent signed with the Patient and all parties understand the risks and agree with anesthesia plan.  All questions answered.   ASA Score: 3  Day of Surgery Review of History & Physical: H&P Update referred to the surgeon/provider.  Anesthesia Plan Notes: Anesthesia plan was discussed with patient and/or representative. Risks and alternatives were discussed including the possibility of  alteration of plan.     Ready For Surgery From Anesthesia Perspective.     .

## 2024-01-24 ENCOUNTER — ANESTHESIA (OUTPATIENT)
Dept: SURGERY | Facility: HOSPITAL | Age: 74
End: 2024-01-24
Payer: MEDICARE

## 2024-01-24 ENCOUNTER — HOSPITAL ENCOUNTER (OUTPATIENT)
Facility: HOSPITAL | Age: 74
Discharge: HOME | End: 2024-01-24
Attending: SURGERY | Admitting: SURGERY
Payer: MEDICARE

## 2024-01-24 VITALS
HEIGHT: 71 IN | OXYGEN SATURATION: 95 % | RESPIRATION RATE: 18 BRPM | WEIGHT: 238.81 LBS | TEMPERATURE: 98 F | DIASTOLIC BLOOD PRESSURE: 57 MMHG | SYSTOLIC BLOOD PRESSURE: 124 MMHG | HEART RATE: 86 BPM | BODY MASS INDEX: 33.43 KG/M2

## 2024-01-24 DIAGNOSIS — K42.9 UMBILICAL HERNIA WITHOUT OBSTRUCTION OR GANGRENE: Primary | Chronic | ICD-10-CM

## 2024-01-24 DIAGNOSIS — K42.9 UMBILICAL HERNIA: ICD-10-CM

## 2024-01-24 PROCEDURE — 36000706: Performed by: SURGERY

## 2024-01-24 PROCEDURE — 36000707: Performed by: SURGERY

## 2024-01-24 PROCEDURE — 63600175 PHARM REV CODE 636 W HCPCS: Performed by: NURSE ANESTHETIST, CERTIFIED REGISTERED

## 2024-01-24 PROCEDURE — 37000009 HC ANESTHESIA EA ADD 15 MINS: Performed by: SURGERY

## 2024-01-24 PROCEDURE — 25000003 PHARM REV CODE 250: Performed by: SURGERY

## 2024-01-24 PROCEDURE — 25000003 PHARM REV CODE 250: Performed by: NURSE ANESTHETIST, CERTIFIED REGISTERED

## 2024-01-24 PROCEDURE — 00750 ANES HRNA RPR UPR ABD NOS: CPT | Mod: QZ,P3 | Performed by: NURSE ANESTHETIST, CERTIFIED REGISTERED

## 2024-01-24 PROCEDURE — D9220AH HC ANESTHESIA PROFESSIONAL FEE: Mod: QZ,P3 | Performed by: NURSE ANESTHETIST, CERTIFIED REGISTERED

## 2024-01-24 PROCEDURE — 71000015 HC POSTOP RECOV 1ST HR: Performed by: SURGERY

## 2024-01-24 PROCEDURE — 71000033 HC RECOVERY, INTIAL HOUR: Performed by: SURGERY

## 2024-01-24 PROCEDURE — 63600175 PHARM REV CODE 636 W HCPCS: Performed by: SURGERY

## 2024-01-24 PROCEDURE — 37000008 HC ANESTHESIA 1ST 15 MINUTES: Performed by: SURGERY

## 2024-01-24 RX ORDER — LIDOCAINE HYDROCHLORIDE 10 MG/ML
INJECTION, SOLUTION EPIDURAL; INFILTRATION; INTRACAUDAL; PERINEURAL
Status: DISCONTINUED | OUTPATIENT
Start: 2024-01-24 | End: 2024-01-24

## 2024-01-24 RX ORDER — LIDOCAINE HYDROCHLORIDE AND EPINEPHRINE 10; 10 MG/ML; UG/ML
INJECTION, SOLUTION INFILTRATION; PERINEURAL
Status: DISCONTINUED | OUTPATIENT
Start: 2024-01-24 | End: 2024-01-24 | Stop reason: HOSPADM

## 2024-01-24 RX ORDER — ATROPINE SULFATE 0.4 MG/ML
INJECTION, SOLUTION ENDOTRACHEAL; INTRAMEDULLARY; INTRAMUSCULAR; INTRAVENOUS; SUBCUTANEOUS
Status: DISCONTINUED | OUTPATIENT
Start: 2024-01-24 | End: 2024-01-24

## 2024-01-24 RX ORDER — CEFAZOLIN SODIUM 1 G/3ML
2 INJECTION, POWDER, FOR SOLUTION INTRAMUSCULAR; INTRAVENOUS
Status: DISCONTINUED | OUTPATIENT
Start: 2024-01-24 | End: 2024-01-24 | Stop reason: HOSPADM

## 2024-01-24 RX ORDER — DEXAMETHASONE SODIUM PHOSPHATE 4 MG/ML
INJECTION, SOLUTION INTRA-ARTICULAR; INTRALESIONAL; INTRAMUSCULAR; INTRAVENOUS; SOFT TISSUE
Status: DISCONTINUED | OUTPATIENT
Start: 2024-01-24 | End: 2024-01-24

## 2024-01-24 RX ORDER — SODIUM CHLORIDE, SODIUM LACTATE, POTASSIUM CHLORIDE, CALCIUM CHLORIDE 600; 310; 30; 20 MG/100ML; MG/100ML; MG/100ML; MG/100ML
INJECTION, SOLUTION INTRAVENOUS CONTINUOUS
Status: ACTIVE | OUTPATIENT
Start: 2024-01-24

## 2024-01-24 RX ORDER — MIDAZOLAM HYDROCHLORIDE 1 MG/ML
INJECTION INTRAMUSCULAR; INTRAVENOUS
Status: DISCONTINUED | OUTPATIENT
Start: 2024-01-24 | End: 2024-01-24

## 2024-01-24 RX ORDER — DIPHENHYDRAMINE HYDROCHLORIDE 50 MG/ML
25 INJECTION INTRAMUSCULAR; INTRAVENOUS EVERY 6 HOURS PRN
Status: DISCONTINUED | OUTPATIENT
Start: 2024-01-24 | End: 2024-01-24 | Stop reason: HOSPADM

## 2024-01-24 RX ORDER — HYDROMORPHONE HYDROCHLORIDE 1 MG/ML
0.5 INJECTION, SOLUTION INTRAMUSCULAR; INTRAVENOUS; SUBCUTANEOUS EVERY 5 MIN PRN
Status: DISCONTINUED | OUTPATIENT
Start: 2024-01-24 | End: 2024-01-24 | Stop reason: HOSPADM

## 2024-01-24 RX ORDER — PROPOFOL 10 MG/ML
VIAL (ML) INTRAVENOUS
Status: DISCONTINUED | OUTPATIENT
Start: 2024-01-24 | End: 2024-01-24

## 2024-01-24 RX ORDER — DROPERIDOL 2.5 MG/ML
INJECTION, SOLUTION INTRAMUSCULAR; INTRAVENOUS
Status: DISCONTINUED | OUTPATIENT
Start: 2024-01-24 | End: 2024-01-24

## 2024-01-24 RX ORDER — MORPHINE SULFATE 10 MG/ML
3 INJECTION INTRAMUSCULAR; INTRAVENOUS; SUBCUTANEOUS
Status: CANCELLED | OUTPATIENT
Start: 2024-01-24

## 2024-01-24 RX ORDER — HYDROCODONE BITARTRATE AND ACETAMINOPHEN 5; 325 MG/1; MG/1
1 TABLET ORAL EVERY 4 HOURS PRN
Status: CANCELLED | OUTPATIENT
Start: 2024-01-24

## 2024-01-24 RX ORDER — MEPERIDINE HYDROCHLORIDE 25 MG/ML
12.5 INJECTION INTRAMUSCULAR; INTRAVENOUS; SUBCUTANEOUS EVERY 10 MIN PRN
Status: DISCONTINUED | OUTPATIENT
Start: 2024-01-24 | End: 2024-01-24 | Stop reason: HOSPADM

## 2024-01-24 RX ORDER — EPHEDRINE SULFATE 50 MG/ML
INJECTION, SOLUTION INTRAVENOUS
Status: DISCONTINUED | OUTPATIENT
Start: 2024-01-24 | End: 2024-01-24

## 2024-01-24 RX ORDER — SODIUM CHLORIDE 9 MG/ML
INJECTION, SOLUTION INTRAVENOUS CONTINUOUS
Status: CANCELLED | OUTPATIENT
Start: 2024-01-24

## 2024-01-24 RX ORDER — ONDANSETRON 4 MG/1
8 TABLET, ORALLY DISINTEGRATING ORAL EVERY 8 HOURS PRN
Status: CANCELLED | OUTPATIENT
Start: 2024-01-24

## 2024-01-24 RX ORDER — HYDROCODONE BITARTRATE AND ACETAMINOPHEN 5; 325 MG/1; MG/1
1 TABLET ORAL EVERY 6 HOURS PRN
Qty: 25 TABLET | Refills: 0 | Status: SHIPPED | OUTPATIENT
Start: 2024-01-24 | End: 2024-05-02

## 2024-01-24 RX ORDER — ONDANSETRON HYDROCHLORIDE 2 MG/ML
INJECTION, SOLUTION INTRAVENOUS
Status: DISCONTINUED | OUTPATIENT
Start: 2024-01-24 | End: 2024-01-24

## 2024-01-24 RX ORDER — FENTANYL CITRATE 50 UG/ML
INJECTION, SOLUTION INTRAMUSCULAR; INTRAVENOUS
Status: DISCONTINUED | OUTPATIENT
Start: 2024-01-24 | End: 2024-01-24

## 2024-01-24 RX ORDER — ONDANSETRON HYDROCHLORIDE 2 MG/ML
4 INJECTION, SOLUTION INTRAVENOUS DAILY PRN
Status: DISCONTINUED | OUTPATIENT
Start: 2024-01-24 | End: 2024-01-24 | Stop reason: HOSPADM

## 2024-01-24 RX ADMIN — FENTANYL CITRATE 100 MCG: 50 INJECTION INTRAMUSCULAR; INTRAVENOUS at 08:01

## 2024-01-24 RX ADMIN — DROPERIDOL 0.62 MG: 2.5 INJECTION, SOLUTION INTRAMUSCULAR; INTRAVENOUS at 08:01

## 2024-01-24 RX ADMIN — CEFAZOLIN 2 G: 330 INJECTION, POWDER, FOR SOLUTION INTRAMUSCULAR; INTRAVENOUS at 08:01

## 2024-01-24 RX ADMIN — SODIUM CHLORIDE, POTASSIUM CHLORIDE, SODIUM LACTATE AND CALCIUM CHLORIDE: 600; 310; 30; 20 INJECTION, SOLUTION INTRAVENOUS at 07:01

## 2024-01-24 RX ADMIN — MIDAZOLAM HYDROCHLORIDE 2 MG: 1 INJECTION, SOLUTION INTRAMUSCULAR; INTRAVENOUS at 08:01

## 2024-01-24 RX ADMIN — PROPOFOL 170 MG: 10 INJECTION, EMULSION INTRAVENOUS at 08:01

## 2024-01-24 RX ADMIN — DEXAMETHASONE SODIUM PHOSPHATE 4 MG: 4 INJECTION, SOLUTION INTRA-ARTICULAR; INTRALESIONAL; INTRAMUSCULAR; INTRAVENOUS; SOFT TISSUE at 08:01

## 2024-01-24 RX ADMIN — ATROPINE SULFATE 0.4 MG: 0.4 INJECTION, SOLUTION INTRAMUSCULAR; INTRAVENOUS; SUBCUTANEOUS at 08:01

## 2024-01-24 RX ADMIN — EPHEDRINE SULFATE 25 MG: 50 INJECTION INTRAVENOUS at 08:01

## 2024-01-24 RX ADMIN — LIDOCAINE HYDROCHLORIDE 20 MG: 10 INJECTION, SOLUTION EPIDURAL; INFILTRATION; INTRACAUDAL; PERINEURAL at 08:01

## 2024-01-24 RX ADMIN — ONDANSETRON HYDROCHLORIDE 4 MG: 2 SOLUTION INTRAMUSCULAR; INTRAVENOUS at 08:01

## 2024-01-24 NOTE — DISCHARGE INSTRUCTIONS
Keep dressings dry and in place for 2 days (48 hours).    Once 48 hours have passed, remove top gauze dressing but leave the steri strips that are under the gauze in place until they fall off on their own.     After two days have passed, you can then take a shower and wet the site but do not submerge area in water (NO baths, hot tubs, pools).     No lifting more than 10 lbs for 8 weeks.    Do not drive or operate any heavy machinery for 24 hours.    Do not sign any important documents for 24 hours.    Resume medications and diet as per normal.

## 2024-01-24 NOTE — OP NOTE
Procedure date:  01/20/2024     Indications:  73-year-old white male with symptomatic umbilical hernia elected to undergo open umbilical hernia repair     Preoperative diagnosis:  Symptomatic umbilical hernia   Postoperative diagnosis:  Symptomatic umbilical hernia     Procedure performed: Open primary closure of symptomatic umbilical hernia     Procedure in detail:  Patient was brought to the operating theater laid in a supine position.  General endotracheal intubation anesthesia was provided.  Preoperative antibiotics administered.  There of the abdomen was then trimmed of all hair sterilely prepped and draped in normal surgical fashion using chlorhexidine.  1% lidocaine with epinephrine infiltrate the subcutaneous tissues overlying the supraumbilical space.  A 15 blade was used to incise the skin with dissection down the fatty tissues.  Bovie cauterization and Metzenbaum scissors were used to dissect the umbilical stalk from the hernia sac.  The hernia defect measured less than 1 cm in diameter.  The fascial edges as well as the preperitoneal space was dissected free.  The defect was then closed in interrupted fashion using 1. PDS suture.  The skin and subcutaneous tissues were then reapproximated in a multilayered fashion using 3-0 Vicryl and 4-0 subcuticular Monocryl.  A sterile dressing was then placed upon the wound.  The patient was then relieved of anesthesia stable condition and transferred to postanesthesia care unit.    Complications:  None  Estimated blood loss:  2 cc   Specimens:  None     Disposition:  Upon recovering from anesthesia patient will be discharged to home with a follow up in surgery Clinic in 1 week.      Caryl Bragg MD

## 2024-01-24 NOTE — ANESTHESIA POSTPROCEDURE EVALUATION
Anesthesia Post Evaluation    Patient: Jose De Jesus Oropeza    Procedure(s) Performed: Procedure(s) (LRB):  REPAIR, HERNIA, UMBILICAL (N/A)    Final Anesthesia Type: general      Patient location during evaluation: PACU  Patient participation: Yes- Able to Participate  Level of consciousness: awake and alert  Post-procedure vital signs: reviewed and stable  Pain management: adequate  Airway patency: patent    PONV status at discharge: No PONV  Anesthetic complications: no      Cardiovascular status: blood pressure returned to baseline  Respiratory status: unassisted  Hydration status: euvolemic  Follow-up not needed.              Vitals Value Taken Time   /64 01/24/24 0858   Temp 36.2 01/24/24 0858   Pulse 92 01/24/24 0858   Resp 20 01/24/24 0858   SpO2 98% 01/24/24 0858         No case tracking events are documented in the log.      Pain/Logan Score: No data recorded

## 2024-01-24 NOTE — ANESTHESIA PROCEDURE NOTES
Intubation    Date/Time: 1/24/2024 8:09 AM    Performed by: Nimesh Elizalde CRNA  Authorized by: Nimesh Elizalde CRNA    Intubation:     Induction:  Intravenous    Mask Ventilation:  Easy mask    Attempts:  1    Attempted By:  CRNA    Method of Intubation:  Blind intubation    Laryngeal View Grade: Laryngeal Manipulation      Laryngeal View Grade (2nd Attempt): Laryngeal Manipulation      Laryngeal View Grade (3rd Attempt): Laryngeal Manipulation      Difficult Airway Encountered?: No      Complications:  None    Airway Device:  Supraglottic airway/LMA    Airway Device Size:  5.0    Style/Cuff Inflation:  Cuffed (inflated to minimal occlusive pressure)    Placement Verified By:  Capnometry    Complicating Factors:  None    Findings Post-Intubation:  BS equal bilateral

## 2024-01-24 NOTE — TRANSFER OF CARE
"Anesthesia Transfer of Care Note    Patient: Jose De Jesus Oropeza    Procedure(s) Performed: Procedure(s) (LRB):  REPAIR, HERNIA, UMBILICAL (N/A)    Patient location: PACU    Anesthesia Type: general    Transport from OR: Transported from OR on room air with adequate spontaneous ventilation    Post pain: adequate analgesia    Post assessment: no apparent anesthetic complications    Post vital signs: stable    Level of consciousness: awake    Nausea/Vomiting: no nausea/vomiting    Complications: none    Transfer of care protocol was followed      Last vitals: Visit Vitals  BP (!) 149/77   Temp 36.4 °C (97.6 °F) (Oral)   Resp 18   Ht 5' 11" (1.803 m)   Wt 108.3 kg (238 lb 12.8 oz)   SpO2 95%   BMI 33.31 kg/m²     "

## 2024-02-27 ENCOUNTER — LAB VISIT (OUTPATIENT)
Dept: LAB | Facility: HOSPITAL | Age: 74
End: 2024-02-27
Attending: INTERNAL MEDICINE
Payer: MEDICARE

## 2024-02-27 DIAGNOSIS — L95.9 CUTANEOUS VASCULITIS: ICD-10-CM

## 2024-02-27 DIAGNOSIS — M05.711 RHEUMATOID ARTHRITIS INVOLVING RIGHT SHOULDER WITH POSITIVE RHEUMATOID FACTOR: ICD-10-CM

## 2024-02-27 DIAGNOSIS — Z79.899 ENCOUNTER FOR LONG-TERM (CURRENT) USE OF OTHER MEDICATIONS: Primary | ICD-10-CM

## 2024-02-27 LAB
ALBUMIN SERPL-MCNC: 3.8 G/DL (ref 3.4–4.8)
ALBUMIN/GLOB SERPL: 1.1 RATIO (ref 1.1–2)
ALP SERPL-CCNC: 96 UNIT/L (ref 40–150)
ALT SERPL-CCNC: 27 UNIT/L (ref 0–55)
AST SERPL-CCNC: 21 UNIT/L (ref 5–34)
BASOPHILS # BLD AUTO: 0.06 X10(3)/MCL
BASOPHILS NFR BLD AUTO: 0.9 %
BILIRUB SERPL-MCNC: 0.5 MG/DL
BILIRUBIN DIRECT+TOT PNL SERPL-MCNC: 0.2 MG/DL (ref 0–?)
BUN SERPL-MCNC: 15 MG/DL (ref 8.4–25.7)
CALCIUM SERPL-MCNC: 9.4 MG/DL (ref 8.8–10)
CHLORIDE SERPL-SCNC: 109 MMOL/L (ref 98–107)
CK SERPL-CCNC: 99 U/L (ref 30–200)
CO2 SERPL-SCNC: 23 MMOL/L (ref 23–31)
CREAT SERPL-MCNC: 0.91 MG/DL (ref 0.73–1.18)
CRP SERPL-MCNC: 2 MG/L
EOSINOPHIL # BLD AUTO: 0.21 X10(3)/MCL (ref 0–0.9)
EOSINOPHIL NFR BLD AUTO: 3.3 %
ERYTHROCYTE [DISTWIDTH] IN BLOOD BY AUTOMATED COUNT: 13.2 % (ref 11.5–17)
ERYTHROCYTE [SEDIMENTATION RATE] IN BLOOD: 11 MM/HR (ref 0–15)
GFR SERPLBLD CREATININE-BSD FMLA CKD-EPI: >60 MLS/MIN/1.73/M2
GLOBULIN SER-MCNC: 3.6 GM/DL (ref 2.4–3.5)
GLUCOSE SERPL-MCNC: 121 MG/DL (ref 82–115)
HCT VFR BLD AUTO: 44.5 % (ref 42–52)
HGB BLD-MCNC: 15.2 G/DL (ref 14–18)
IMM GRANULOCYTES # BLD AUTO: 0.02 X10(3)/MCL (ref 0–0.04)
IMM GRANULOCYTES NFR BLD AUTO: 0.3 %
LYMPHOCYTES # BLD AUTO: 1.93 X10(3)/MCL (ref 0.6–4.6)
LYMPHOCYTES NFR BLD AUTO: 30 %
MCH RBC QN AUTO: 33.1 PG (ref 27–31)
MCHC RBC AUTO-ENTMCNC: 34.2 G/DL (ref 33–36)
MCV RBC AUTO: 96.9 FL (ref 80–94)
MONOCYTES # BLD AUTO: 0.62 X10(3)/MCL (ref 0.1–1.3)
MONOCYTES NFR BLD AUTO: 9.6 %
NEUTROPHILS # BLD AUTO: 3.59 X10(3)/MCL (ref 2.1–9.2)
NEUTROPHILS NFR BLD AUTO: 55.9 %
NRBC BLD AUTO-RTO: 0 %
PHOSPHATE SERPL-MCNC: 2.1 MG/DL (ref 2.3–4.7)
PLATELET # BLD AUTO: 273 X10(3)/MCL (ref 130–400)
PMV BLD AUTO: 9.4 FL (ref 7.4–10.4)
POTASSIUM SERPL-SCNC: 4.1 MMOL/L (ref 3.5–5.1)
PROT SERPL-MCNC: 7.4 GM/DL (ref 5.8–7.6)
RBC # BLD AUTO: 4.59 X10(6)/MCL (ref 4.7–6.1)
SODIUM SERPL-SCNC: 141 MMOL/L (ref 136–145)
URATE SERPL-MCNC: 7.8 MG/DL (ref 3.5–7.2)
WBC # SPEC AUTO: 6.43 X10(3)/MCL (ref 4.5–11.5)

## 2024-02-27 PROCEDURE — 36415 COLL VENOUS BLD VENIPUNCTURE: CPT

## 2024-02-27 PROCEDURE — 82550 ASSAY OF CK (CPK): CPT

## 2024-02-27 PROCEDURE — 86140 C-REACTIVE PROTEIN: CPT

## 2024-02-27 PROCEDURE — 85652 RBC SED RATE AUTOMATED: CPT

## 2024-02-27 PROCEDURE — 82248 BILIRUBIN DIRECT: CPT

## 2024-02-27 PROCEDURE — 85025 COMPLETE CBC W/AUTO DIFF WBC: CPT

## 2024-02-27 PROCEDURE — 80053 COMPREHEN METABOLIC PANEL: CPT

## 2024-02-27 PROCEDURE — 84550 ASSAY OF BLOOD/URIC ACID: CPT

## 2024-02-27 PROCEDURE — 84100 ASSAY OF PHOSPHORUS: CPT

## 2024-05-02 ENCOUNTER — OFFICE VISIT (OUTPATIENT)
Dept: FAMILY MEDICINE | Facility: CLINIC | Age: 74
End: 2024-05-02
Payer: MEDICARE

## 2024-05-02 VITALS
SYSTOLIC BLOOD PRESSURE: 128 MMHG | HEIGHT: 71 IN | OXYGEN SATURATION: 96 % | RESPIRATION RATE: 18 BRPM | WEIGHT: 237 LBS | TEMPERATURE: 97 F | HEART RATE: 76 BPM | BODY MASS INDEX: 33.18 KG/M2 | DIASTOLIC BLOOD PRESSURE: 64 MMHG

## 2024-05-02 DIAGNOSIS — I10 HYPERTENSION, UNSPECIFIED TYPE: Primary | ICD-10-CM

## 2024-05-02 DIAGNOSIS — Z12.11 COLON CANCER SCREENING: ICD-10-CM

## 2024-05-02 DIAGNOSIS — I50.9 HEART FAILURE, UNSPECIFIED HF CHRONICITY, UNSPECIFIED HEART FAILURE TYPE: ICD-10-CM

## 2024-05-02 DIAGNOSIS — Z79.899 DRUG-INDUCED IMMUNODEFICIENCY: ICD-10-CM

## 2024-05-02 DIAGNOSIS — D84.821 DRUG-INDUCED IMMUNODEFICIENCY: ICD-10-CM

## 2024-05-02 DIAGNOSIS — M06.9 RHEUMATOID ARTHRITIS, INVOLVING UNSPECIFIED SITE, UNSPECIFIED WHETHER RHEUMATOID FACTOR PRESENT: ICD-10-CM

## 2024-05-02 PROBLEM — I77.6 ARTERITIS, UNSPECIFIED: Status: RESOLVED | Noted: 2023-04-17 | Resolved: 2024-05-02

## 2024-05-02 PROCEDURE — 99214 OFFICE O/P EST MOD 30 MIN: CPT | Mod: ,,, | Performed by: FAMILY MEDICINE

## 2024-05-02 NOTE — PROGRESS NOTES
"Subjective:      Patient ID: Jose De Jesus Oropeza is a 73 y.o. male.    Chief Complaint: 6 month      Routine    Hypertension  - tolerating medication (no side effects)  - no headaches  - patient without any complaints    Review of Systems   Constitutional: Negative.    Respiratory: Negative.     Cardiovascular: Negative.    Gastrointestinal: Negative.    Musculoskeletal:         RA: no recent flares, tolerating medication   Psychiatric/Behavioral: Negative.           Objective:     /64 (BP Location: Left arm, Patient Position: Sitting, BP Method: Large (Manual))   Pulse 76   Temp 96.9 °F (36.1 °C)   Resp 18   Ht 5' 11" (1.803 m)   Wt 107.5 kg (237 lb)   SpO2 96%   BMI 33.05 kg/m²    Physical Exam  Constitutional:       Appearance: Normal appearance.   Cardiovascular:      Rate and Rhythm: Normal rate and regular rhythm.      Heart sounds: Normal heart sounds.   Pulmonary:      Effort: Pulmonary effort is normal.      Breath sounds: Normal breath sounds.   Neurological:      Mental Status: He is alert.   Psychiatric:         Mood and Affect: Mood normal.         Behavior: Behavior normal.         Thought Content: Thought content normal.         Judgment: Judgment normal.             Assessment:     Problem List Items Addressed This Visit          Cardiac/Vascular    Heart failure, unspecified HF chronicity, unspecified heart failure type    Current Assessment & Plan     Followed by Dr. Salcedo         Hypertension - Primary       Immunology/Multi System    Rheumatoid arthritis, involving unspecified site, unspecified whether rheumatoid factor present    Current Assessment & Plan     Followed by Dr. Howe         Drug-induced immunodeficiency    Current Assessment & Plan     Followed by Dr. Howe            Endocrine    BMI 33.0-33.9,adult    Current Assessment & Plan     Reduced calorie diet modification  Frequent self weighing   Exercise/lifestyle modification          Other Visit Diagnoses       Colon " cancer screening                 Plan:   1. Hypertension, unspecified type  Controlled  Continue current Rx medication  Return to clinic with any concerns    2. BMI 33.0-33.9,adult  Assessment & Plan:  Reduced calorie diet modification  Frequent self weighing   Exercise/lifestyle modification    3. Rheumatoid arthritis, involving unspecified site, unspecified whether rheumatoid factor present  Assessment & Plan:  Followed by Dr. Howe    4. Heart failure, unspecified HF chronicity, unspecified heart failure type  Assessment & Plan:  Followed by Dr. Salcedo    5. Drug-induced immunodeficiency  Assessment & Plan:  Followed by Dr. Howe    6. Colon cancer screening  Patient defers colonoscopy/Cologuard

## 2024-06-17 ENCOUNTER — PATIENT OUTREACH (OUTPATIENT)
Facility: CLINIC | Age: 74
End: 2024-06-17
Payer: MEDICARE

## 2024-06-17 NOTE — PROGRESS NOTES
Health Maintenance Topic(s) Outreach Outcomes & Actions Taken:    Colorectal Cancer Screening - Outreach Outcomes & Actions Taken  : Pt Declined Completing Colorectal Cancer Screening and patient refused     Primary Care Appt - Outreach Outcomes & Actions Taken  : Primary Care Appt Scheduled and patient has scheduled Primary Care visit on 11/19/2024                                     Additional Notes:           Care Management, Digital Medicine, and/or Education Referrals      Next Steps - Referral Actions: Digital Medicine Outcomes and Actions Taken: declined and no referrals sent

## 2024-10-22 DIAGNOSIS — Z00.00 WELLNESS EXAMINATION: ICD-10-CM

## 2024-10-22 DIAGNOSIS — Z11.4 ENCOUNTER FOR HIV (HUMAN IMMUNODEFICIENCY VIRUS) TEST: ICD-10-CM

## 2024-10-22 DIAGNOSIS — Z12.5 SCREENING FOR MALIGNANT NEOPLASM OF PROSTATE: ICD-10-CM

## 2024-10-22 DIAGNOSIS — Z11.59 NEED FOR HEPATITIS C SCREENING TEST: ICD-10-CM

## 2024-10-22 DIAGNOSIS — I10 HYPERTENSION, UNSPECIFIED TYPE: Primary | ICD-10-CM

## 2024-10-22 DIAGNOSIS — E03.9 HYPOTHYROIDISM, UNSPECIFIED TYPE: ICD-10-CM

## 2024-10-22 DIAGNOSIS — E78.5 HYPERLIPIDEMIA, UNSPECIFIED HYPERLIPIDEMIA TYPE: ICD-10-CM

## 2024-12-16 ENCOUNTER — LAB VISIT (OUTPATIENT)
Dept: LAB | Facility: HOSPITAL | Age: 74
End: 2024-12-16
Attending: FAMILY MEDICINE
Payer: MEDICARE

## 2024-12-16 ENCOUNTER — OFFICE VISIT (OUTPATIENT)
Dept: FAMILY MEDICINE | Facility: CLINIC | Age: 74
End: 2024-12-16
Payer: MEDICARE

## 2024-12-16 VITALS
OXYGEN SATURATION: 96 % | TEMPERATURE: 97 F | RESPIRATION RATE: 18 BRPM | WEIGHT: 239 LBS | BODY MASS INDEX: 33.46 KG/M2 | SYSTOLIC BLOOD PRESSURE: 136 MMHG | HEIGHT: 71 IN | DIASTOLIC BLOOD PRESSURE: 82 MMHG | HEART RATE: 90 BPM

## 2024-12-16 DIAGNOSIS — Z11.4 ENCOUNTER FOR HIV (HUMAN IMMUNODEFICIENCY VIRUS) TEST: ICD-10-CM

## 2024-12-16 DIAGNOSIS — Z11.59 NEED FOR HEPATITIS C SCREENING TEST: ICD-10-CM

## 2024-12-16 DIAGNOSIS — Z00.00 WELLNESS EXAMINATION: Primary | ICD-10-CM

## 2024-12-16 DIAGNOSIS — I10 HYPERTENSION, UNSPECIFIED TYPE: ICD-10-CM

## 2024-12-16 DIAGNOSIS — M25.562 CHRONIC PAIN OF LEFT KNEE: ICD-10-CM

## 2024-12-16 DIAGNOSIS — E78.5 HYPERLIPIDEMIA, UNSPECIFIED HYPERLIPIDEMIA TYPE: ICD-10-CM

## 2024-12-16 DIAGNOSIS — Z12.11 COLON CANCER SCREENING: ICD-10-CM

## 2024-12-16 DIAGNOSIS — Z00.00 WELLNESS EXAMINATION: ICD-10-CM

## 2024-12-16 DIAGNOSIS — G89.29 CHRONIC PAIN OF LEFT KNEE: ICD-10-CM

## 2024-12-16 DIAGNOSIS — Z12.5 SCREENING FOR MALIGNANT NEOPLASM OF PROSTATE: ICD-10-CM

## 2024-12-16 LAB
ALBUMIN SERPL-MCNC: 3.6 G/DL (ref 3.4–4.8)
ALBUMIN/GLOB SERPL: 1.1 RATIO (ref 1.1–2)
ALP SERPL-CCNC: 102 UNIT/L (ref 40–150)
ALT SERPL-CCNC: 29 UNIT/L (ref 0–55)
ANION GAP SERPL CALC-SCNC: 8 MEQ/L
AST SERPL-CCNC: 24 UNIT/L (ref 5–34)
BASOPHILS # BLD AUTO: 0.05 X10(3)/MCL
BASOPHILS NFR BLD AUTO: 0.6 %
BILIRUB SERPL-MCNC: 0.6 MG/DL
BUN SERPL-MCNC: 10 MG/DL (ref 8.4–25.7)
CALCIUM SERPL-MCNC: 9.1 MG/DL (ref 8.8–10)
CHLORIDE SERPL-SCNC: 109 MMOL/L (ref 98–107)
CHOLEST SERPL-MCNC: 141 MG/DL
CHOLEST/HDLC SERPL: 3 {RATIO} (ref 0–5)
CO2 SERPL-SCNC: 25 MMOL/L (ref 23–31)
CREAT SERPL-MCNC: 0.83 MG/DL (ref 0.72–1.25)
CREAT/UREA NIT SERPL: 12
EOSINOPHIL # BLD AUTO: 0.28 X10(3)/MCL (ref 0–0.9)
EOSINOPHIL NFR BLD AUTO: 3.6 %
ERYTHROCYTE [DISTWIDTH] IN BLOOD BY AUTOMATED COUNT: 13.2 % (ref 11.5–17)
GFR SERPLBLD CREATININE-BSD FMLA CKD-EPI: >60 ML/MIN/1.73/M2
GLOBULIN SER-MCNC: 3.4 GM/DL (ref 2.4–3.5)
GLUCOSE SERPL-MCNC: 130 MG/DL (ref 82–115)
HCT VFR BLD AUTO: 43.8 % (ref 42–52)
HCV AB SERPL QL IA: NONREACTIVE
HDLC SERPL-MCNC: 41 MG/DL (ref 35–60)
HGB BLD-MCNC: 14.8 G/DL (ref 14–18)
HIV 1+2 AB+HIV1 P24 AG SERPL QL IA: NONREACTIVE
IMM GRANULOCYTES # BLD AUTO: 0.02 X10(3)/MCL (ref 0–0.04)
IMM GRANULOCYTES NFR BLD AUTO: 0.3 %
LDLC SERPL CALC-MCNC: 80 MG/DL (ref 50–140)
LYMPHOCYTES # BLD AUTO: 1.92 X10(3)/MCL (ref 0.6–4.6)
LYMPHOCYTES NFR BLD AUTO: 24.4 %
MCH RBC QN AUTO: 32.8 PG (ref 27–31)
MCHC RBC AUTO-ENTMCNC: 33.8 G/DL (ref 33–36)
MCV RBC AUTO: 97.1 FL (ref 80–94)
MONOCYTES # BLD AUTO: 0.76 X10(3)/MCL (ref 0.1–1.3)
MONOCYTES NFR BLD AUTO: 9.7 %
NEUTROPHILS # BLD AUTO: 4.84 X10(3)/MCL (ref 2.1–9.2)
NEUTROPHILS NFR BLD AUTO: 61.4 %
NRBC BLD AUTO-RTO: 0 %
PLATELET # BLD AUTO: 268 X10(3)/MCL (ref 130–400)
PMV BLD AUTO: 9.3 FL (ref 7.4–10.4)
POTASSIUM SERPL-SCNC: 3.9 MMOL/L (ref 3.5–5.1)
PROT SERPL-MCNC: 7 GM/DL (ref 5.8–7.6)
PSA SERPL-MCNC: 0.62 NG/ML
RBC # BLD AUTO: 4.51 X10(6)/MCL (ref 4.7–6.1)
SODIUM SERPL-SCNC: 142 MMOL/L (ref 136–145)
TRIGL SERPL-MCNC: 99 MG/DL (ref 34–140)
VLDLC SERPL CALC-MCNC: 20 MG/DL
WBC # BLD AUTO: 7.87 X10(3)/MCL (ref 4.5–11.5)

## 2024-12-16 PROCEDURE — 99214 OFFICE O/P EST MOD 30 MIN: CPT | Mod: 25,,, | Performed by: FAMILY MEDICINE

## 2024-12-16 PROCEDURE — G0439 PPPS, SUBSEQ VISIT: HCPCS | Mod: ,,, | Performed by: FAMILY MEDICINE

## 2024-12-16 PROCEDURE — 86803 HEPATITIS C AB TEST: CPT

## 2024-12-16 PROCEDURE — 84153 ASSAY OF PSA TOTAL: CPT

## 2024-12-16 PROCEDURE — 87389 HIV-1 AG W/HIV-1&-2 AB AG IA: CPT

## 2024-12-16 PROCEDURE — 80061 LIPID PANEL: CPT

## 2024-12-16 PROCEDURE — 80053 COMPREHEN METABOLIC PANEL: CPT

## 2024-12-16 PROCEDURE — 36415 COLL VENOUS BLD VENIPUNCTURE: CPT

## 2024-12-16 PROCEDURE — 85025 COMPLETE CBC W/AUTO DIFF WBC: CPT

## 2024-12-16 RX ORDER — NIFEDIPINE 60 MG/1
60 TABLET, EXTENDED RELEASE ORAL DAILY
COMMUNITY
Start: 2024-11-26

## 2024-12-16 NOTE — ASSESSMENT & PLAN NOTE
Lab work reviewed with patient  Continue current medication  Continue diet/exercise  Advanced directive discussed with patient  Return to clinic with any concerns    Advance Care Planning     Date: 12/16/2024    Living Will  During this visit, I engaged the patient  in the voluntary advance care planning process.  The patient and I reviewed the role for advance directives and their purpose in directing future healthcare if the patient's unable to speak for him/herself.  At this point in time, the patient does have full decision-making capacity.  We discussed different extreme health states that he could experience, and reviewed what kind of medical care he would want in those situations.  The patient communicated that if he were comatose and had little chance of a meaningful recovery, he would not want machines/life-sustaining treatments used.   I spent a total of 5 minutes engaging the patient in this advance care planning discussion.

## 2024-12-16 NOTE — PROGRESS NOTES
Patient ID: [id number]     Chief Complaint: Medicare AWV Follow Up      HPI:     Jose De Jesus Oropeza is a 74 y.o. male here today for a Medicare Wellness.       Opioid Screening: Patient medication list reviewed, patient is not taking prescription opioids. Patient is not using additional opioids than prescribed. Patient is not at low risk of substance abuse based on this opioid use history.       -------------------------------------    Arthritis    CHF (congestive heart failure)    Coronary artery disease    Hypertension    Hypothyroidism    Sleep apnea, unspecified    Spondylosis        Past Surgical History:   Procedure Laterality Date    discemtomy      knee replacent Left     REPAIR, HERNIA, UMBILICAL N/A 1/24/2024    Procedure: REPAIR, HERNIA, UMBILICAL;  Surgeon: Caryl Bragg MD;  Location: [facility];  Service: General;  Laterality: N/A;       Review of patient's allergies indicates:  No Known Allergies    Outpatient Medications Marked as Taking for the 12/16/24 encounter (Office Visit) with Babak Grajeda MD   Medication Sig Dispense Refill    methotrexate 2.5 MG Tab TAKE 8 TABLETS BY MOUTH ONCE A WEEK, HOLD FOR FEVER, INFECTION, OR SURGERY FOR 84 DAYS      NIFEdipine (ADALAT CC) 60 MG TbSR Take 60 mg by mouth once daily.      valsartan (DIOVAN) 320 MG tablet Take 320 mg by mouth once daily.         Social History     Socioeconomic History    Marital status:    Tobacco Use    Smoking status: Never    Smokeless tobacco: Never   Substance and Sexual Activity    Alcohol use: Not Currently    Drug use: Never     Social Drivers of Health     Financial Resource Strain: Low Risk  (4/17/2023)    Overall Financial Resource Strain (CARDIA)     Difficulty of Paying Living Expenses: Not hard at all   Food Insecurity: No Food Insecurity (4/17/2023)    Hunger Vital Sign     Worried About Running Out of Food in the Last Year: Never true     Ran Out of Food in the Last Year: Never true   Transportation Needs: No  "Transportation Needs (4/17/2023)    PRAPARE - Transportation     Lack of Transportation (Medical): No     Lack of Transportation (Non-Medical): No   Physical Activity: Sufficiently Active (4/17/2023)    Exercise Vital Sign     Days of Exercise per Week: 7 days     Minutes of Exercise per Session: 60 min   Stress: No Stress Concern Present (4/17/2023)    [employer] [place] [employer] - Occupational Stress Questionnaire     Feeling of Stress : Not at all   Housing Stability: Unknown (4/17/2023)    Housing Stability Vital Sign     Unable to Pay for Housing in the Last Year: No     Unstable Housing in the Last Year: No        No family history on file.     Patient Care Team:  Babak Grajeda MD as PCP - General (Family Medicine)  Babak Grajeda MD Meche, Jedediah D, FNP as Nurse Practitioner (Internal Medicine)  Primo Salcedo MD as Consulting Physician (Cardiology)  Niko Martin MD as Consulting Physician (Urology)  Ashok Whitt MD as Consulting Physician (Rheumatology)  Jason Rowe MD (Dermatology)  Tamy Garcia LPN as Licensed Practical Nurse       Subjective:     Review of Systems   Constitutional: Negative.    Respiratory: Negative.     Cardiovascular: Negative.    Gastrointestinal: Negative.    Musculoskeletal:         Left knee pain: hx of TKA, intermittent pain, occ swelling, no recent trauma, reports having trouble kneeling,no pain with ambulation   Psychiatric/Behavioral: Negative.           Patient Reported Health Risk Assessment       Objective:     /82   Pulse 90   Temp 97.2 °F (36.2 °C) (Temporal)   Resp 18   Ht 5' 10.87" (1.8 m)   Wt 108.4 kg (239 lb)   SpO2 96%   BMI 33.46 kg/m²     Physical Exam  Constitutional:       Appearance: Normal appearance.   Cardiovascular:      Rate and Rhythm: Normal rate and regular rhythm.   Pulmonary:      Effort: Pulmonary effort is normal.      Breath sounds: Normal breath sounds.   Abdominal:      General: " Abdomen is flat. Bowel sounds are normal.      Palpations: Abdomen is soft.   Musculoskeletal:      Comments: Left knee:  Full range of motion, TTP on lateral tibia   Neurological:      Mental Status: He is alert.   Psychiatric:         Mood and Affect: Mood normal.         Behavior: Behavior normal.         Thought Content: Thought content normal.         Judgment: Judgment normal.       Recent Results (from the past 3 weeks)   Hepatitis C Antibody    Collection Time: 12/16/24  7:45 AM   Result Value Ref Range    Hep C Ab Interp Nonreactive Nonreactive   Comprehensive Metabolic Panel    Collection Time: 12/16/24  7:45 AM   Result Value Ref Range    Sodium 142 136 - 145 mmol/L    Potassium 3.9 3.5 - 5.1 mmol/L    Chloride 109 (H) 98 - 107 mmol/L    CO2 25 23 - 31 mmol/L    Glucose 130 (H) 82 - 115 mg/dL    Blood Urea Nitrogen 10.0 8.4 - 25.7 mg/dL    Creatinine 0.83 0.72 - 1.25 mg/dL    Calcium 9.1 8.8 - 10.0 mg/dL    Protein Total 7.0 5.8 - 7.6 gm/dL    Albumin 3.6 3.4 - 4.8 g/dL    Globulin 3.4 2.4 - 3.5 gm/dL    Albumin/Globulin Ratio 1.1 1.1 - 2.0 ratio    Bilirubin Total 0.6 <=1.5 mg/dL     40 - 150 unit/L    ALT 29 0 - 55 unit/L    AST 24 5 - 34 unit/L    eGFR >60 mL/min/1.73/m2    Anion Gap 8.0 mEq/L    BUN/Creatinine Ratio 12    HIV 1/2 Ag/Ab (4th Gen)    Collection Time: 12/16/24  7:45 AM   Result Value Ref Range    HIV Nonreactive Nonreactive   Lipid Panel    Collection Time: 12/16/24  7:45 AM   Result Value Ref Range    Cholesterol Total 141 <=200 mg/dL    HDL Cholesterol 41 35 - 60 mg/dL    Triglyceride 99 34 - 140 mg/dL    Cholesterol/HDL Ratio 3 0 - 5    Very Low Density Lipoprotein 20     LDL Cholesterol 80.00 50.00 - 140.00 mg/dL   PSA, Screening    Collection Time: 12/16/24  7:45 AM   Result Value Ref Range    Prostate Specific Antigen 0.62 <=4.00 ng/mL   CBC with Differential    Collection Time: 12/16/24  7:45 AM   Result Value Ref Range    WBC 7.87 4.50 - 11.50 x10(3)/mcL    RBC 4.51 (L)  4.70 - 6.10 x10(6)/mcL    Hgb 14.8 14.0 - 18.0 g/dL    Hct 43.8 42.0 - 52.0 %    MCV 97.1 (H) 80.0 - 94.0 fL    MCH 32.8 (H) 27.0 - 31.0 pg    MCHC 33.8 33.0 - 36.0 g/dL    RDW 13.2 11.5 - 17.0 %    Platelet 268 130 - 400 x10(3)/mcL    MPV 9.3 7.4 - 10.4 fL    Neut % 61.4 %    Lymph % 24.4 %    Mono % 9.7 %    Eos % 3.6 %    Basophil % 0.6 %    Lymph # 1.92 0.6 - 4.6 x10(3)/mcL    Neut # 4.84 2.1 - 9.2 x10(3)/mcL    Mono # 0.76 0.1 - 1.3 x10(3)/mcL    Eos # 0.28 0 - 0.9 x10(3)/mcL    Baso # 0.05 <=0.2 x10(3)/mcL    IG# 0.02 0 - 0.04 x10(3)/mcL    IG% 0.3 %    NRBC% 0.0 %               No data to display                  5/2/2024     8:00 AM 10/26/2023     9:00 AM 4/17/2023     2:00 PM 10/5/2022    10:15 AM 5/17/2022     9:30 AM   Fall Risk Assessment - Outpatient   Mobility Status Ambulatory Ambulatory Ambulatory Ambulatory Ambulatory   Number of falls 0 0 0 0 0   Identified as fall risk False False False False False              Assessment/Plan:     1. Wellness examination  Assessment & Plan:  Lab work reviewed with patient  Continue current medication  Continue diet/exercise  Advanced directive discussed with patient  Return to clinic with any concerns    Advance Care Planning    Date: 12/16/2024    Living Will  During this visit, I engaged the patient  in the voluntary advance care planning process.  The patient and I reviewed the role for advance directives and their purpose in directing future healthcare if the patient's unable to speak for him/herself.  At this point in time, the patient does have full decision-making capacity.  We discussed different extreme health states that he could experience, and reviewed what kind of medical care he would want in those situations.  The patient communicated that if he were comatose and had little chance of a meaningful recovery, he would not want machines/life-sustaining treatments used.   I spent a total of 5 minutes engaging the patient in this advance care planning  discussion.     2. Chronic pain of left knee  -     X-Ray Knee 1 or 2 View Left; Future; Expected date: 12/16/2024  -     Ambulatory referral/consult to Orthopedics; Future; Expected date: 12/23/2024  Schedule left knee x-ray   Refer patient to Dr. Best   Return to clinic with any concerns     3. Colon cancer screening  Patient defers colonoscopy/Cologuard         Medicare Annual Wellness and Personalized Prevention Plan:   Fall Risk + Home Safety + Hearing Impairment + Depression Screen + Opioid and Substance Abuse Screening + Cognitive Impairment Screen + Health Risk Assessment all reviewed.     Health Maintenance Topics with due status: Not Due       Topic Last Completion Date    Lipid Panel 12/16/2024      The patient's Health Maintenance was reviewed and the following appears to be due at this time:   Health Maintenance Due   Topic Date Due    Pneumococcal Vaccines (Age 65+) (1 of 2 - PCV) Never done    TETANUS VACCINE  Never done    Shingles Vaccine (1 of 2) Never done    High Dose Statin  Never done    Colorectal Cancer Screening  Never done    RSV Vaccine (Age 60+ and Pregnant patients) (1 - Risk 60-74 years 1-dose series) Never done    Influenza Vaccine (1) Never done    COVID-19 Vaccine (4 - 2024-25 season) 09/01/2024       Advance Care Planning   I attest to discussing Advance Care Planning with patient and/or family member.  Education was provided including the importance of the Health Care Power of , Advance Directives, and/or [place] documentation.  The patient expressed understanding to the importance of this information and discussion.         Follow up in about 6 months (around 6/16/2025). In addition to their scheduled follow up, the patient has also been instructed to follow up on as needed basis.

## 2025-01-09 ENCOUNTER — HOSPITAL ENCOUNTER (OUTPATIENT)
Dept: RADIOLOGY | Facility: HOSPITAL | Age: 75
Discharge: HOME | End: 2025-01-09
Attending: FAMILY MEDICINE
Payer: MEDICARE

## 2025-01-09 DIAGNOSIS — G89.29 CHRONIC PAIN OF LEFT KNEE: ICD-10-CM

## 2025-01-09 DIAGNOSIS — M25.562 CHRONIC PAIN OF LEFT KNEE: ICD-10-CM

## 2025-01-09 PROCEDURE — 73560 X-RAY EXAM OF KNEE 1 OR 2: CPT | Mod: TC,LT

## 2025-02-04 ENCOUNTER — OFFICE VISIT (OUTPATIENT)
Dept: ORTHOPEDICS | Facility: CLINIC | Age: 75
End: 2025-02-04
Payer: MEDICARE

## 2025-02-04 VITALS
SYSTOLIC BLOOD PRESSURE: 150 MMHG | DIASTOLIC BLOOD PRESSURE: 81 MMHG | WEIGHT: 236.38 LBS | BODY MASS INDEX: 33.84 KG/M2 | HEIGHT: 70 IN | HEART RATE: 75 BPM

## 2025-02-04 DIAGNOSIS — M25.362 KNEE INSTABILITY, LEFT: ICD-10-CM

## 2025-02-04 DIAGNOSIS — G89.29 CHRONIC PAIN OF LEFT KNEE: ICD-10-CM

## 2025-02-04 DIAGNOSIS — Z96.652 S/P TOTAL KNEE ARTHROPLASTY, LEFT: Primary | ICD-10-CM

## 2025-02-04 DIAGNOSIS — M25.562 CHRONIC PAIN OF LEFT KNEE: ICD-10-CM

## 2025-02-04 PROCEDURE — 99203 OFFICE O/P NEW LOW 30 MIN: CPT | Mod: ,,, | Performed by: REHABILITATION UNIT

## 2025-02-04 NOTE — PROGRESS NOTES
"Subjective:      Patient ID: Jose De Jesus Oropeza is a 74 y.o. male.    Chief Complaint: Pain of the Left Knee (Hx of TKA around 2022 from DR. Wang- Stated is unable to kneel on due to pain. Has swelling at times when standing for long periods of times. Is taking tylenol prn which helps with some of the pain. Denies any numbness or stiffness. )    HPI:   Jose De Jesus Oropeza is a 74 y.o. male who presents today for initial evaluation of his left knee    History of Present Illness    CHIEF COMPLAINT:  - Left knee discomfort and inability to kneel    HPI:  Jose De Jesus presents for evaluation of his left knee, which was replaced approximately three years ago in September 2022. He reports ongoing issues with his knee, stating he cannot kneel down. He describes discomfort in a specific area and notes that it bothers him at night, requiring him to move the knee around to find a comfortable position. His condition is better than before the surgery but he has experienced these issues with kneeling since the procedure. He characterizes the sensation as feeling "not right" rather than painful, with occasional discomfort. 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 He denies any issues with healing or complications immediately following the surgery, stating everything went perfectly. He reports feeling a "little pop" and describes a sensation of the knee being "tinted" and not feeling right in a specific area. When asked about the severity of symptoms and whether they warrant further intervention, he indicates they do not.    He denies any formal medical diagnoses.    PREVIOUS TREATMENTS:  - Jose De Jesus has been managing symptoms on his own since surgery, including continuing activities like crawfishing and working on tractors.    IMAGING:  - X-rays of the left knee: Components are in good position, and there are no signs of any loosening or failure. Nothing is broken around the knee " replacement.    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  - This involves kneeling and getting in and out of equipment, which may be contributing to the discomfort in the patient's left knee      ROS:  Musculoskeletal: +joint pain         Past Medical History:   Diagnosis Date    Arthritis     CHF (congestive heart failure)     Coronary artery disease     Hypertension     Hypothyroidism     Sleep apnea, unspecified     Spondylosis      Past Surgical History:   Procedure Laterality Date    discemtomy      knee replacent Left     REPAIR, HERNIA, UMBILICAL N/A 1/24/2024    Procedure: REPAIR, HERNIA, UMBILICAL;  Surgeon: Caryl Bragg MD;  Location: [facility];  Service: General;  Laterality: N/A;     Social History     Socioeconomic History    Marital status:    Tobacco Use    Smoking status: Never    Smokeless tobacco: Never   Substance and Sexual Activity    Alcohol use: Not Currently    Drug use: Never     Social Drivers of Health     Financial Resource Strain: Low Risk  (4/17/2023)    Overall Financial Resource Strain (CARDIA)     Difficulty of Paying Living Expenses: Not hard at all   Food Insecurity: No Food Insecurity (4/17/2023)    Hunger Vital Sign     Worried About Running Out of Food in the Last Year: Never true     Ran Out of Food in the Last Year: Never true   Transportation Needs: No Transportation Needs (4/17/2023)    PRAPARE - Transportation     Lack of Transportation (Medical): No     Lack of Transportation (Non-Medical): No   Physical Activity: Sufficiently Active (4/17/2023)    Exercise Vital Sign     Days of Exercise per Week: 7 days     Minutes of Exercise per Session: 60 min   Stress: No Stress Concern Present (4/17/2023)    [employer] [place] [employer] - Occupational Stress Questionnaire     Feeling of Stress : Not at all   Housing Stability: Unknown (4/17/2023)    Housing Stability  "Vital Sign     Unable to Pay for Housing in the Last Year: No     Unstable Housing in the Last Year: No         Current Outpatient Medications:     methotrexate 2.5 MG Tab, TAKE 8 TABLETS BY MOUTH ONCE A WEEK, HOLD FOR FEVER, INFECTION, OR SURGERY FOR 84 DAYS, Disp: , Rfl:     NIFEdipine (ADALAT CC) 60 MG TbSR, Take 60 mg by mouth once daily., Disp: , Rfl:     valsartan (DIOVAN) 320 MG tablet, Take 320 mg by mouth once daily., Disp: , Rfl:   No current facility-administered medications for this visit.    Facility-Administered Medications Ordered in Other Visits:     lactated ringers infusion, , Intravenous, Continuous, Nimesh Elizalde, CRNA, Last Rate: 125 mL/hr at 01/24/24 0738, New Bag at 01/24/24 0738  Review of patient's allergies indicates:  No Known Allergies    BP (!) 150/81   Pulse 75   Ht 5' 10" (1.778 m)   Wt 107.2 kg (236 lb 6.4 oz)   BMI 33.92 kg/m²     Comprehensive review of systems completed and negative except as per HPI.        Objective:   Head: Normocephalic, without obvious abnormality, atraumatic  Eyes: conjunctivae/corneas clear. EOM's intact  Ears: normal external appearance  Nose: Nares normal. Septum midline. Mucosa normal. No drainage  Throat: normal findings: lips normal without lesions  Lungs: unlabored breathing on room air  Chest wall: symmetric chest rise  Heart: regular rate and rhythm  Pulses: 2+ and symmetric  Skin: Skin color, texture, turgor normal. No rashes or lesions  Neurologic: Grossly normal    left KNEE:     Alignment: neutral  Appearance: skin is intact with well-healed anterior knee incision  Effusion: none  Gait:  Within normal limits  Ankle ROM: full and painless   Knee ROM:  0 - 110  Tenderness:  Mild tenderness anterior   Patellar Mobility: normal  Valgus Stress @ 0 deg: stable  Valgus Stress @ 30 deg: stable  Varus Stress @ 0 deg: stable  Varus Stress @ 30 deg: stable  He has increased translation with anterior and posterior stress  Posterior Sag Sign: " negative  Neurological deficits: SILT dp/sp/t distributions  Motor: 5/5 EHL/FHL/TA/GS    Warm well perfused lower extremity with capillary refill less than 2 seconds and sensation intact to light touch in the terminal nerve distributions. Calf soft and easily compressible without clinical sign of DVT. No palpable popliteal lymphadenopathy    Assessment:     Imaging:   Radiographs of the left knee show total knee arthroplasty components in appropriate position with no signs of loosening or failure        1. S/P total knee arthroplasty, left    2. Chronic pain of left knee    3. Knee instability, left          Plan:       Orders Placed This Encounter    CBC Auto Differential    CRP, High Sensitivity    Sedimentation rate        Imaging and exam findings discussed.  Patient has persistent pain after total knee arthroplasty a few years ago.  On exam he has instability with significant translation with anterior and posterior stress.  I am going to send him to physical therapy to work on strengthening his knee to provide some stability.  He has had recent ESR and CRP with his rheumatologist which were within normal limits.  After discussion I am going to refer him to 1 of my arthroplasty colleagues for further evaluation of his instability. All questions were answered. Patient happy and in agreement with the plan.     This note was generated with the assistance of ambient listening technology. Verbal consent was obtained by the patient and accompanying visitor(s) for the recording of patient appointment to facilitate this note. I attest to having reviewed and edited the generated note for accuracy, though some syntax or spelling errors may persist. Please contact the author of this note for any clarification.

## 2025-02-12 ENCOUNTER — LAB VISIT (OUTPATIENT)
Dept: LAB | Facility: HOSPITAL | Age: 75
End: 2025-02-12
Attending: UROLOGY
Payer: MEDICARE

## 2025-02-12 DIAGNOSIS — Z96.652 S/P TOTAL KNEE ARTHROPLASTY, LEFT: ICD-10-CM

## 2025-02-12 DIAGNOSIS — G89.29 CHRONIC PAIN OF LEFT KNEE: ICD-10-CM

## 2025-02-12 DIAGNOSIS — M25.362 KNEE INSTABILITY, LEFT: ICD-10-CM

## 2025-02-12 DIAGNOSIS — M25.562 CHRONIC PAIN OF LEFT KNEE: ICD-10-CM

## 2025-02-12 LAB
BASOPHILS # BLD AUTO: 0.06 X10(3)/MCL
BASOPHILS NFR BLD AUTO: 0.8 %
CRP SERPL HS-MCNC: 4.87 MG/L
EOSINOPHIL # BLD AUTO: 0.23 X10(3)/MCL (ref 0–0.9)
EOSINOPHIL NFR BLD AUTO: 3.1 %
ERYTHROCYTE [DISTWIDTH] IN BLOOD BY AUTOMATED COUNT: 13.6 % (ref 11.5–17)
ERYTHROCYTE [SEDIMENTATION RATE] IN BLOOD: 21 MM/HR (ref 0–15)
HCT VFR BLD AUTO: 43.8 % (ref 42–52)
HGB BLD-MCNC: 14.7 G/DL (ref 14–18)
IMM GRANULOCYTES # BLD AUTO: 0.02 X10(3)/MCL (ref 0–0.04)
IMM GRANULOCYTES NFR BLD AUTO: 0.3 %
LYMPHOCYTES # BLD AUTO: 2.02 X10(3)/MCL (ref 0.6–4.6)
LYMPHOCYTES NFR BLD AUTO: 27.4 %
MCH RBC QN AUTO: 32.6 PG (ref 27–31)
MCHC RBC AUTO-ENTMCNC: 33.6 G/DL (ref 33–36)
MCV RBC AUTO: 97.1 FL (ref 80–94)
MONOCYTES # BLD AUTO: 0.62 X10(3)/MCL (ref 0.1–1.3)
MONOCYTES NFR BLD AUTO: 8.4 %
NEUTROPHILS # BLD AUTO: 4.41 X10(3)/MCL (ref 2.1–9.2)
NEUTROPHILS NFR BLD AUTO: 60 %
NRBC BLD AUTO-RTO: 0 %
PLATELET # BLD AUTO: 266 X10(3)/MCL (ref 130–400)
PMV BLD AUTO: 9.5 FL (ref 7.4–10.4)
RBC # BLD AUTO: 4.51 X10(6)/MCL (ref 4.7–6.1)
WBC # BLD AUTO: 7.36 X10(3)/MCL (ref 4.5–11.5)

## 2025-02-12 PROCEDURE — 86141 C-REACTIVE PROTEIN HS: CPT

## 2025-02-12 PROCEDURE — 85652 RBC SED RATE AUTOMATED: CPT

## 2025-02-12 PROCEDURE — 36415 COLL VENOUS BLD VENIPUNCTURE: CPT

## 2025-02-12 PROCEDURE — 85025 COMPLETE CBC W/AUTO DIFF WBC: CPT

## 2025-03-21 ENCOUNTER — HOSPITAL ENCOUNTER (EMERGENCY)
Facility: HOSPITAL | Age: 75
Discharge: HOME | End: 2025-03-21
Attending: FAMILY MEDICINE
Payer: MEDICARE

## 2025-03-21 VITALS
DIASTOLIC BLOOD PRESSURE: 86 MMHG | BODY MASS INDEX: 34.07 KG/M2 | WEIGHT: 230 LBS | OXYGEN SATURATION: 94 % | SYSTOLIC BLOOD PRESSURE: 146 MMHG | HEIGHT: 69 IN | RESPIRATION RATE: 17 BRPM | TEMPERATURE: 98 F | HEART RATE: 87 BPM

## 2025-03-21 DIAGNOSIS — R42 DIZZINESS: ICD-10-CM
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
## 2025-03-21 LAB
ALBUMIN SERPL-MCNC: 4.5 G/DL (ref 3.4–4.8)
ALBUMIN/GLOB SERPL: 1.2 RATIO (ref 1.1–2)
ALP SERPL-CCNC: 108 UNIT/L (ref 40–150)
ALT SERPL-CCNC: 30 UNIT/L (ref 0–55)
ANION GAP SERPL CALC-SCNC: 13 MEQ/L
AST SERPL-CCNC: 27 UNIT/L (ref 11–45)
BASOPHILS # BLD AUTO: 0.05 X10(3)/MCL
BASOPHILS NFR BLD AUTO: 0.3 %
BILIRUB SERPL-MCNC: 0.5 MG/DL
BILIRUB UR QL STRIP.AUTO: NEGATIVE
BNP BLD-MCNC: <10 PG/ML
BUN SERPL-MCNC: 13 MG/DL (ref 8.4–25.7)
CALCIUM SERPL-MCNC: 9.7 MG/DL (ref 8.8–10)
CHLORIDE SERPL-SCNC: 108 MMOL/L (ref 98–107)
CK SERPL-CCNC: 156 U/L (ref 30–200)
CLARITY UR: CLEAR
CO2 SERPL-SCNC: 22 MMOL/L (ref 23–31)
COLOR UR AUTO: YELLOW
CREAT SERPL-MCNC: 0.95 MG/DL (ref 0.72–1.25)
CREAT/UREA NIT SERPL: 14
EOSINOPHIL # BLD AUTO: 0.08 X10(3)/MCL (ref 0–0.9)
EOSINOPHIL NFR BLD AUTO: 0.5 %
ERYTHROCYTE [DISTWIDTH] IN BLOOD BY AUTOMATED COUNT: 13.7 % (ref 11.5–17)
GFR SERPLBLD CREATININE-BSD FMLA CKD-EPI: >60 ML/MIN/1.73/M2
GLOBULIN SER-MCNC: 3.8 GM/DL (ref 2.4–3.5)
GLUCOSE SERPL-MCNC: 127 MG/DL (ref 82–115)
GLUCOSE UR QL STRIP: NEGATIVE
HCT VFR BLD AUTO: 48.5 % (ref 42–52)
HGB BLD-MCNC: 16.3 G/DL (ref 14–18)
HGB UR QL STRIP: NEGATIVE
IMM GRANULOCYTES # BLD AUTO: 0.06 X10(3)/MCL (ref 0–0.04)
IMM GRANULOCYTES NFR BLD AUTO: 0.4 %
KETONES UR QL STRIP: NEGATIVE
LEUKOCYTE ESTERASE UR QL STRIP: NEGATIVE
LYMPHOCYTES # BLD AUTO: 1.63 X10(3)/MCL (ref 0.6–4.6)
LYMPHOCYTES NFR BLD AUTO: 11.1 %
MCH RBC QN AUTO: 32.9 PG (ref 27–31)
MCHC RBC AUTO-ENTMCNC: 33.6 G/DL (ref 33–36)
MCV RBC AUTO: 98 FL (ref 80–94)
MONOCYTES # BLD AUTO: 0.99 X10(3)/MCL (ref 0.1–1.3)
MONOCYTES NFR BLD AUTO: 6.8 %
NEUTROPHILS # BLD AUTO: 11.81 X10(3)/MCL (ref 2.1–9.2)
NEUTROPHILS NFR BLD AUTO: 80.9 %
NITRITE UR QL STRIP: NEGATIVE
NRBC BLD AUTO-RTO: 0 %
PH UR STRIP: 6 [PH]
PLATELET # BLD AUTO: 314 X10(3)/MCL (ref 130–400)
PMV BLD AUTO: 9.5 FL (ref 7.4–10.4)
POCT GLUCOSE: 136 MG/DL (ref 70–110)
POTASSIUM SERPL-SCNC: 4.1 MMOL/L (ref 3.5–5.1)
PROT SERPL-MCNC: 8.3 GM/DL (ref 5.8–7.6)
PROT UR QL STRIP: NEGATIVE
RBC # BLD AUTO: 4.95 X10(6)/MCL (ref 4.7–6.1)
SODIUM SERPL-SCNC: 143 MMOL/L (ref 136–145)
SP GR UR STRIP.AUTO: 1.02 (ref 1–1.03)
TROPONIN I SERPL-MCNC: <0.01 NG/ML (ref 0–0.04)
UROBILINOGEN UR STRIP-ACNC: 0.2
WBC # BLD AUTO: 14.62 X10(3)/MCL (ref 4.5–11.5)

## 2025-03-21 PROCEDURE — 84484 ASSAY OF TROPONIN QUANT: CPT | Performed by: FAMILY MEDICINE

## 2025-03-21 PROCEDURE — 99285 EMERGENCY DEPT VISIT HI MDM: CPT | Mod: 25

## 2025-03-21 PROCEDURE — 81003 URINALYSIS AUTO W/O SCOPE: CPT | Performed by: FAMILY MEDICINE

## 2025-03-21 PROCEDURE — 80053 COMPREHEN METABOLIC PANEL: CPT | Performed by: FAMILY MEDICINE

## 2025-03-21 PROCEDURE — 82550 ASSAY OF CK (CPK): CPT | Performed by: FAMILY MEDICINE

## 2025-03-21 PROCEDURE — 82962 GLUCOSE BLOOD TEST: CPT

## 2025-03-21 PROCEDURE — 83880 ASSAY OF NATRIURETIC PEPTIDE: CPT | Performed by: FAMILY MEDICINE

## 2025-03-21 PROCEDURE — 93005 ELECTROCARDIOGRAM TRACING: CPT

## 2025-03-21 PROCEDURE — 85025 COMPLETE CBC W/AUTO DIFF WBC: CPT | Performed by: FAMILY MEDICINE

## 2025-03-21 RX ORDER — AMOXICILLIN 500 MG/1
500 CAPSULE ORAL 3 TIMES DAILY
Qty: 30 CAPSULE | Refills: 0 | Status: SHIPPED | OUTPATIENT
Start: 2025-03-21 | End: 2025-03-31

## 2025-03-21 NOTE — ED PROVIDER NOTES
Encounter Date: 3/21/2025       History     Chief Complaint   Patient presents with    Dizziness     Dizziness, sweating, epigastric pressure since this am.       This patient is a 74-year-old male who comes in with dizziness,, and epigastric pain since this morning.  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  He does not have help so he states that by the time the night comes he is very very tired.  Today he had an episode where he became very diaphoretic in his wife brought him in.    The history is provided by the patient.     Review of patient's allergies indicates:  No Known Allergies  Past Medical History:   Diagnosis Date    Arthritis     CHF (congestive heart failure)     Coronary artery disease     Hypertension     Hypothyroidism     Sleep apnea, unspecified     Spondylosis      Past Surgical History:   Procedure Laterality Date    discemtomy      knee replacent Left     REPAIR, HERNIA, UMBILICAL N/A 1/24/2024    Procedure: REPAIR, HERNIA, UMBILICAL;  Surgeon: Caryl Bragg MD;  Location: [facility];  Service: General;  Laterality: N/A;     No family history on file.  Social History[1]  Review of Systems   Constitutional:  Positive for fatigue.   HENT: Negative.     Respiratory: Negative.     Cardiovascular: Negative.    Endocrine: Negative.    Musculoskeletal:  Positive for back pain.   Skin: Negative.    Neurological:  Positive for weakness.   Psychiatric/Behavioral: Negative.     All other systems reviewed and are negative.      Physical Exam     Initial Vitals [03/21/25 1536]   BP Pulse Resp Temp SpO2   (!) 146/86 85 (!) 22 97.7 °F (36.5 °C) 97 %      MAP       --         Physical Exam    Nursing note and vitals reviewed.  Constitutional: He appears well-developed and well-nourished.   HENT:   Head: Normocephalic.   Eyes: Pupils are equal, round, and reactive to light.   Neck:   Normal range  of motion.  Cardiovascular:  Normal rate and regular rhythm.           Pulmonary/Chest: Breath sounds normal.   Abdominal: Abdomen is soft. Bowel sounds are normal.   Musculoskeletal:         General: Normal range of motion.      Cervical back: Normal range of motion.     Neurological: He is alert and oriented to person, place, and time.   Skin: Skin is warm and dry.   Psychiatric: He has a normal mood and affect.         ED Course   Procedures  Labs Reviewed   COMPREHENSIVE METABOLIC PANEL - Abnormal       Result Value    Sodium 143      Potassium 4.1      Chloride 108 (*)     CO2 22 (*)     Glucose 127 (*)     Blood Urea Nitrogen 13.0      Creatinine 0.95      Calcium 9.7      Protein Total 8.3 (*)     Albumin 4.5      Globulin 3.8 (*)     Albumin/Globulin Ratio 1.2      Bilirubin Total 0.5            ALT 30      AST 27      eGFR >60      Anion Gap 13.0      BUN/Creatinine Ratio 14     CBC WITH DIFFERENTIAL - Abnormal    WBC 14.62 (*)     RBC 4.95      Hgb 16.3      Hct 48.5      MCV 98.0 (*)     MCH 32.9 (*)     MCHC 33.6      RDW 13.7      Platelet 314      MPV 9.5      Neut % 80.9      Lymph % 11.1      Mono % 6.8      Eos % 0.5      Basophil % 0.3      Imm Grans % 0.4      Neut # 11.81 (*)     Lymph # 1.63      Mono # 0.99      Eos # 0.08      Baso # 0.05      Imm Gran # 0.06 (*)     NRBC% 0.0     POCT GLUCOSE - Abnormal    POCT Glucose 136 (*)    TROPONIN I - Normal    Troponin-I <0.010     B-TYPE NATRIURETIC PEPTIDE - Normal    Natriuretic Peptide <10.0     CK - Normal    Creatine Kinase 156     URINALYSIS, REFLEX TO URINE CULTURE - Normal    Color, UA Yellow      Appearance, UA Clear      Specific Gravity, UA 1.025      pH, UA 6.0      Protein, UA Negative      Glucose, UA Negative      Ketones, UA Negative      Blood, UA Negative      Bilirubin, UA Negative      Urobilinogen, UA 0.2      Nitrites, UA Negative      Leukocyte Esterase, UA Negative     CBC W/ AUTO DIFFERENTIAL    Narrative:     The  following orders were created for panel order CBC auto differential.  Procedure                               Abnormality         Status                     ---------                               -----------         ------                     CBC with Differential[[id number]]       Abnormal            Final result                 Please view results for these tests on the individual orders.     EKG Readings: (Independently Interpreted)   Rhythm: Normal Sinus Rhythm. Ectopy: PACs. ST Segments: Normal ST Segments. Clinical Impression: Normal Sinus Rhythm with RBBB     ECG Results              EKG 12-lead (In process)        Collection Time Result Time QRS Duration OHS QTC Calculation    03/21/25 15:31:28 03/21/25 15:42:53 102 444                     In process by Interface, Lab In [facility] (03/21/25 15:43:00)                   Narrative:    Test Reason : R42,    Vent. Rate :  83 BPM     Atrial Rate :  83 BPM     P-R Int : 162 ms          QRS Dur : 102 ms      QT Int : 378 ms       P-R-T Axes :  14 -44  46 degrees    QTcB Int : 444 ms    Sinus rhythm with Premature atrial complexes  Left axis deviation  Pulmonary disease pattern  Incomplete right bundle branch block  Abnormal ECG  No previous ECGs available    Referred By: AAAREFERRAL SELF           Confirmed By:                                   Imaging Results              CT Head Without Contrast (Final result)  Result time 03/21/25 16:07:02      Final result by Wiley Rock MD (03/21/25 16:07:02)                   Impression:      1.  No acute intracranial findings identified.    2.  Chronic microangiopathic ischemia and atrophy.      Electronically signed by: Wiley Rock  Date:    03/21/2025  Time:    16:07               Narrative:    EXAMINATION:  CT HEAD WITHOUT CONTRAST    CLINICAL HISTORY:  Dizziness, persistent/recurrent, cardiac or vascular cause suspected;    TECHNIQUE:  Sequential axial images were performed of the brain without contrast.    Dose  product length of 1060 mGycm. Automated exposure control was utilized to minimize radiation dose.    COMPARISON:  None available.    FINDINGS:  There is no intracranial mass effect, midline shift, hydrocephalus or hemorrhage. There is no sulcal effacement or low attenuation changes to suggest recent large vessel territory infarction. Chronic appearing periventricular and subcortical white matter low attenuation changes are consistent with chronic microangiopathic ischemia. The ventricular system and sulcal markings prominence is consistent with atrophy. There is no acute extra axial fluid collection. Visualized paranasal sinuses are clear without mucosal thickening, polypoidal abnormality or air-fluid levels. Mastoid air cells aeration is optimal.                                       X-Ray Chest AP Portable (Final result)  Result time 03/21/25 16:00:43      Final result by Paul Gamez MD (03/21/25 16:00:43)                   Impression:      No acute chest disease is identified.      Electronically signed by: Paul Gamez  Date:    03/21/2025  Time:    16:00               Narrative:    EXAMINATION:  XR CHEST AP PORTABLE    CLINICAL HISTORY:  Chest Pain;, .    FINDINGS:  No alveolar consolidation, effusion, or pneumothorax is seen.   The thoracic aorta is normal  cardiac silhouette, central pulmonary vessels and mediastinum are normal in size and are grossly unremarkable.   visualized osseous structures are grossly unremarkable.                                       Medications - No data to display  Medical Decision Making  This patient is a 74-year-old male who comes in with epigastric pain, sweating and weakness.  Patient states that he had recently had a stress test at the end of 2024  Differential diagnosis:  Viral syndrome, dehydration, acute renal injury, hyperglycemia, urinary tract infection, CVA  Patient's chest pain significantly improved upon discharge    Amount and/or Complexity of Data  Reviewed  Labs: ordered.     Details: Patient's lab work shows that his urinalysis was clear, troponin was negative at 0.010, BNP was less than 10, CK was 156, CMP shows a chloride of 108, CO2 of 22, glucose of 127, WBC count of 14.62  Radiology: ordered.    Risk  Prescription drug management.                                      Clinical Impression:  Final diagnoses:  [R42] Dizziness  [X50.9XXA] Overexertion, initial encounter (Primary)          ED Disposition Condition    Discharge Stable          ED Prescriptions       Medication Sig Dispense Start Date End Date Auth. Provider    amoxicillin (AMOXIL) 500 MG capsule Take 1 capsule (500 mg total) by mouth 3 (three) times daily. for 10 days 30 capsule 3/21/2025 3/31/2025 Arleth Amaro MD          Follow-up Information       Follow up With Specialties Details Why Contact Info    Babak Grajeda MD Family Medicine Call in 3 days  [address]  [facility] [address]  [phone]                 [1]   Social History  Tobacco Use    Smoking status: Never    Smokeless tobacco: Never   Substance Use Topics    Alcohol use: Not Currently    Drug use: Never        Arleth Amaro MD  03/21/25 [id number]

## 2025-03-22 LAB
OHS QRS DURATION: 102 MS
OHS QTC CALCULATION: 444 MS

## 2025-03-23 ENCOUNTER — TELEPHONE (OUTPATIENT)
Dept: EMERGENCY MEDICINE | Facility: HOSPITAL | Age: 75
End: 2025-03-23
Payer: MEDICARE

## 2025-03-23 NOTE — TELEPHONE ENCOUNTER
Phone called this patient on 03/23/2025 to see how he was doing.  Mr. Jose De Jesus Oropeza answered the phone and told me that he was feeling much better.  He stated that he has been resting since he left the hospital and he is able to walk better now and he still has a little bit of dizziness but it is getting better.  I told him that we were here and available if he needed to come in for anything.  States that his back pain is also better because he has been taking a leave.

## 2025-03-25 ENCOUNTER — OFFICE VISIT (OUTPATIENT)
Dept: ORTHOPEDICS | Facility: CLINIC | Age: 75
End: 2025-03-25
Payer: MEDICARE

## 2025-03-25 VITALS
HEIGHT: 69 IN | DIASTOLIC BLOOD PRESSURE: 80 MMHG | WEIGHT: 231.38 LBS | BODY MASS INDEX: 34.27 KG/M2 | SYSTOLIC BLOOD PRESSURE: 167 MMHG | HEART RATE: 74 BPM

## 2025-03-25 DIAGNOSIS — M54.16 LUMBAR RADICULOPATHY, CHRONIC: ICD-10-CM

## 2025-03-25 DIAGNOSIS — T84.093A FAILED TOTAL LEFT KNEE REPLACEMENT, INITIAL ENCOUNTER: Primary | ICD-10-CM

## 2025-03-25 NOTE — PROGRESS NOTES
Chief Complaint:   Chief Complaint   Patient presents with    Left Knee - Pain     Hx of TKA in 2023 with Dr. Dela Cruz - Pt reports burning sensations at the bottom of his knee cap. Pt states the pain will wake him up a night. Reports swelling when he's on his feet all day. Pt will take Tylenol for relief.        History of present illness:    History of Present Illness  The patient presents for evaluation of knee pain.    He underwent a knee procedure performed by Dr. Ignacio approximately 2 years ago. He has been experiencing persistent discomfort in the knee, which has recently escalated to numbness, described as a band-like sensation around the base of the knee. This numbness does not impede his daily activities but is exacerbated by prolonged standing on concrete surfaces, a common occurrence in his lifestyle. The onset of this aggravation was noted about 6 months ago. His sleep is frequently disrupted due to the pain, which radiates down his leg. He reports no instability in the knee during ambulation.    He also reports a history of sciatica, for which Dr. Best recommended surgical intervention. He has not engaged in any physical therapy for his lumbar spine. He had back surgery done by Dr. Chatman.    Past Medical History:   Diagnosis Date    Arthritis     CHF (congestive heart failure)     Coronary artery disease     Hypertension     Hypothyroidism     Sleep apnea, unspecified     Spondylosis        Past Surgical History:   Procedure Laterality Date    discemtomy      knee replacent Left 2023    REPAIR, HERNIA, UMBILICAL N/A 01/24/2024    Procedure: REPAIR, HERNIA, UMBILICAL;  Surgeon: Caryl Bragg MD;  Location: [facility];  Service: General;  Laterality: N/A;       Current Outpatient Medications   Medication Sig    amoxicillin (AMOXIL) 500 MG capsule Take 1 capsule (500 mg total) by mouth 3 (three) times daily. for 10 days    methotrexate 2.5 MG Tab TAKE 8 TABLETS BY MOUTH ONCE A WEEK, HOLD FOR FEVER,  INFECTION, OR SURGERY FOR 84 DAYS    NIFEdipine (ADALAT CC) 60 MG TbSR Take 60 mg by mouth once daily.    valsartan (DIOVAN) 320 MG tablet Take 320 mg by mouth once daily.     No current facility-administered medications for this visit.     Facility-Administered Medications Ordered in Other Visits   Medication    lactated ringers infusion       Review of patient's allergies indicates:  No Known Allergies    No family history on file.    Social History[1]        Review of Systems:    Constitution: Negative for chills, fever, and sweats.  Negative for unexplained weight loss.    HENT:  Negative for headaches and blurry vision.    Cardiovascular:Negative for chest pain or irregular heart beat. Negative for hypertension.    Respiratory:  Negative for cough and shortness of breath.    Gastrointestinal: Negative for abdominal pain, heartburn, melena, nausea, and vomitting.    Genitourinary:  Negative bladder incontinence and dysuria.    Musculoskeletal:  See HPI    Neurological: Negative for numbness.    Psychiatric/Behavioral: Negative for depression.  The patient is not nervous/anxious.      Endocrine: Negative for polyuria    Hematologic/Lymphatic: Negative for bleeding problem.  Does not bruise/bleed easily.    Skin: Negative for poor would healing and rash      Physical Examination:    Vital Signs:    Vitals:    03/25/25 0830   BP: (!) 167/80   Pulse: 74       Body mass index is 34.17 kg/m².    General: No acute distress, alert and oriented, healthy appearing    HEENT: Head is atraumatic, mucous membranes are moist    Neck: Supples, no JVD    Cardiovascular: Palpable dorsalis pedis and posterior tibial pulses, regular rate and rhythm to those pulses    Lungs: Breathing non-labored    Skin: no rashes appreciated    Neurologic: Can flex and extend knees, ankles, and toes. Sensation is grossly intact    Left knee:  Brisk cap refill disappeared sensation intact disappeared range of motion of the left knee is  well-maintained.  Gets full extension.  Flexion of the.  We had 115°.  Stable to varus and valgus.  Stable anterior-posterior drawer.  Does have some diffuse tenderness although some of this has going all the way down his leg.    X-rays:  Three views left knee reviewed with the patient's implants appear well fixed with no signs of loosening or subsidence noted.     Assessment::  Status post total knee arthroplasty, lumbar radiculopathy    Plan:  Plan to get him in physical therapy for his back.  Does have slightly elevated inflammatory markers although infection is fairly low differential perspective.  We will plan to work on some physical therapy for his back move on from there when he returns in 6-8 weeks.    This note was generated with the assistance of ambient listening technology. Verbal consent was obtained by the patient and accompanying visitor(s) for the recording of patient appointment to facilitate this note. I attest to having reviewed and edited the generated note for accuracy, though some syntax or spelling errors may persist. Please contact the author of this note for any clarification.      This note was created using [employer] voice recognition software that occasionally misinterpreted phrases or words.    Consult note is delivered via Epic messaging service.         [1]   Social History  Socioeconomic History    Marital status:    Tobacco Use    Smoking status: Never    Smokeless tobacco: Never   Substance and Sexual Activity    Alcohol use: Not Currently    Drug use: Never     Social Drivers of Health     Financial Resource Strain: Low Risk  (4/17/2023)    Overall Financial Resource Strain (CARDIA)     Difficulty of Paying Living Expenses: Not hard at all   Food Insecurity: No Food Insecurity (4/17/2023)    Hunger Vital Sign     Worried About Running Out of Food in the Last Year: Never true     Ran Out of Food in the Last Year: Never true   Transportation Needs: No Transportation Needs  (4/17/2023)    PRAPARE - Transportation     Lack of Transportation (Medical): No     Lack of Transportation (Non-Medical): No   Physical Activity: Sufficiently Active (4/17/2023)    Exercise Vital Sign     Days of Exercise per Week: 7 days     Minutes of Exercise per Session: 60 min   Stress: No Stress Concern Present (4/17/2023)    [place] [employer] - Occupational Stress Questionnaire     Feeling of Stress : Not at all   Housing Stability: Unknown (4/17/2023)    Housing Stability Vital Sign     Unable to Pay for Housing in the Last Year: No     Unstable Housing in the Last Year: No

## 2025-04-10 ENCOUNTER — CLINICAL SUPPORT (OUTPATIENT)
Dept: REHABILITATION | Facility: HOSPITAL | Age: 75
End: 2025-04-10
Attending: ORTHOPAEDIC SURGERY
Payer: MEDICARE

## 2025-04-10 DIAGNOSIS — M54.16 LUMBAR RADICULOPATHY, CHRONIC: ICD-10-CM

## 2025-04-10 PROCEDURE — 97163 PT EVAL HIGH COMPLEX 45 MIN: CPT

## 2025-04-10 PROCEDURE — 97530 THERAPEUTIC ACTIVITIES: CPT

## 2025-04-10 NOTE — PROGRESS NOTES
Outpatient Rehab    Physical Therapy Evaluation    Patient Name: Jose De Jesus Oropeza  MRN: [id number]  YOB: 1950  Encounter Date: 4/10/2025    Therapy Diagnosis:   Encounter Diagnosis   Name Primary?    Lumbar radiculopathy, chronic      Physician: Scott Mata MD    Physician Orders: Eval and Treat  Medical Diagnosis: Lumbar radiculopathy, chronic    Visit # / Visits Authorized:  1 / 1  Insurance Authorization Period: 3/25/2025 to 3/25/2026  Date of Evaluation: 4/10/2025  Plan of Care Certification: 4/10/2025 to 7/1/25     Time In: 1245   Time Out: 1345  Total Time: 60   Total Billable Time: 60     Intake Outcome Measure for FOTO Survey    Therapist reviewed FOTO scores for Jose De Jesus Oropeza on 4/10/2025.   FOTO report - see Media section or FOTO account episode details.     Intake Score: 28 (on 4/10/25)%         Subjective   History of Present Illness  Jose De Jesus is a 74 y.o. male                  History of Present Condition/Illness: Patient referred for left LE dysfunction over the past 3 months. He has a h/o left TKA that he thought was affecting him, but MD feels that the dysfunction is coming from his low back. He does have a h/o low back fusion about 13 years ago. He has been having increasing pain and numbness and weakness at his left leg that gets a lot worse with prolonged standing.    Pain     Patient reports a current pain level of 0/10.  Pain at worst is reported as 9/10.   Pain-Relieving Factors: Sitting, Support  Pain-Aggravating Factors: Standing           Past Medical History/Physical Systems Review:   Jose De Jesus Oropeza  has a past medical history of Arthritis, CHF (congestive heart failure), Coronary artery disease, Hypertension, Hypothyroidism, Sleep apnea, unspecified, and Spondylosis.    Jose De Jesus Oropeza  has a past surgical history that includes discemtomy; knee replacent (Left, 2023); and repair, hernia, umbilical (N/A, 01/24/2024).    Jose De Jesus has a current medication list which includes  the following prescription(s): methotrexate, nifedipine, and valsartan, and the following Facility-Administered Medications: lactated ringers.    Review of patient's allergies indicates:  No Known Allergies     Objective          4/10/25   Posture:     Slight forward head, decreased lordotic curve, pelvis rotated to left    Palpation         Dermatomes  Numbness at lateral left LE into his foot all the time; occasional right LE numbness daily   Myotomes    Abdominal core - 2/5    HIP FLX - Right 5/5, Left 2+/5  HIP EXT - Right 3/5, Left 2+/5  Hip IR - right 4/5, left 2/5    HIP ABD - Right 3+/5, Left 2/5  HIP ADD - Right 3+/5, Left 2/5  KNEE FLX - Right 3+/5, Left 2/5  KNEE EXT - Right 5/5, Left 2/5 thru ROM, 4/5 w isometric hold  ANKLE DF - Right 4/5, Left 3/5  ANKLE PF - Right 2+/5, Left 2-/5     Hip/SI Clearance    SUPRIYA: neg B  FADDIR: neg B    SACRAL DISTRACTION: neg B  SACRAL THIGH THRUST: pos L  SACRAL THRUST: pos L  SACRAL COMPRESSION: NT     AROM    Hip flex: 100 R, 85 L  Hip ext: 10 R, 5 L    Lumbar:  Flexion: 25%  Extension: 50%    Left lateral bending: 10%  Right lateral bendin%    Pain with left lat bend, flexion and ext     Flexibility    Nimesh Test: unable  Hip flex: mod restr L, min restr R  90/90 Hamstrin R, 30L  Piriformis Test: min restr L     Special Tests    Slump: pos L  SLR: pos L       Functional Testing    5x STS: 35 sec  2 MWT: 195'    Sit to stand = mod difficulty w B UE support  Bed mobility = max difficulty supine to prone  Transitional movements = mod/max difficulty    Balance    Tandem R forward: 20s w mod/max sway  Tandem L forward: 20s w mod sway   Gait    Patient ambulates w increased stance left w slow hip flexion pull into next step. He has slight trendelenburg at left hip obvious. Decreased pelvic movement at left noted.        Treatment:  Therapeutic Activity  TA 1: Plan of care  TA 2: HEP  TA 3: Patient education    Time Entry(in minutes):  PT Evaluation (Complex)  Time Entry: 45  Therapeutic Activity Time Entry: 15    Assessment & Plan   Assessment  Jose De Jesus presents with a condition of High complexity.   Presentation of Symptoms: Changing  Will Comorbidities Impact Care: Yes       Functional Limitations: Activity tolerance, Disrupted sleep pattern, Pain with ADLs/IADLs, Standing tolerance  Impairments: Abnormal gait, Activity intolerance, Impaired physical strength    Prognosis: Fair  Assessment Details: Patient presents with positive SIJ testing 3 of 5. He has decreased AROM at lumbar region and weakness at left hip and LE. He has positive left slump and SLR test. He has poor functional test scores w difficulty obvious w sit to stand, bed mobility and walking. He has decreased activity tolerance that limits his quality of life. Discussion regarding therapy goals and limitations of therapy due to obvious nerve impingement. Patient in agreement with therapy trial and plan.    Plan  From a physical therapy perspective, the patient would benefit from: Skilled Rehab Services    Planned therapy interventions include: Therapeutic exercise, Therapeutic activities, Neuromuscular re-education, Gait training, ADLs/IADLs, and Manual therapy.    Planned modalities to include: Cryotherapy (cold pack), Electrical stimulation - passive/unattended, and Thermotherapy (hot pack).        Visit Frequency: 2 times Per Week for 3 Months.       This plan was discussed with Patient.   Discussion participants: Agreed Upon Plan of Care             Patient's spiritual, cultural, and educational needs considered and patient agreeable to plan of care and goals.           Goals:   Active       Ambulation/movement       Patient will accommodate walking on uneven surfaces safely       Start:  04/10/25    Expected End:  07/10/25               Functional outcome       Patient will show a significant change in FOTO patient-reported outcome tool to demonstrate subjective improvement       Start:  04/10/25     Expected End:  07/10/25            Patient will report at least 50% overall perceived improvement since start of care       Start:  04/10/25    Expected End:  07/10/25            Patient will demonstrate independence in home program for support of progression       Start:  04/10/25    Expected End:  07/10/25               Pain       Patient will report a 2 point reduction in pain while performing walking        Start:  04/10/25    Expected End:  07/10/25               Range of Motion       Patient will achieve left hip flexion  degrees       Start:  04/10/25    Expected End:  07/10/25               Strength       Patient will achieve left hip strength of 3+/5 in all planes       Start:  04/10/25    Expected End:  07/10/25                Mary Yin, PT

## 2025-04-14 ENCOUNTER — CLINICAL SUPPORT (OUTPATIENT)
Dept: REHABILITATION | Facility: HOSPITAL | Age: 75
End: 2025-04-14
Payer: MEDICARE

## 2025-04-14 DIAGNOSIS — M54.16 LUMBAR RADICULOPATHY, CHRONIC: Primary | ICD-10-CM

## 2025-04-14 PROCEDURE — 97014 ELECTRIC STIMULATION THERAPY: CPT

## 2025-04-14 PROCEDURE — 97110 THERAPEUTIC EXERCISES: CPT

## 2025-04-14 PROCEDURE — 97010 HOT OR COLD PACKS THERAPY: CPT

## 2025-04-14 PROCEDURE — 97530 THERAPEUTIC ACTIVITIES: CPT

## 2025-04-14 NOTE — PROGRESS NOTES
"  Outpatient Rehab    Physical Therapy Visit    Patient Name: Jose De Jesus Oropeza  MRN: [id number]  YOB: 1950  Encounter Date: 4/14/2025    Therapy Diagnosis:   Encounter Diagnosis   Name Primary?    Lumbar radiculopathy, chronic Yes     Physician: Scott Mata MD    Physician Orders: Eval and Treat  Medical Diagnosis: Radiculopathy, lumbar region    Visit # / Visits Authorized:  1 / 25  Insurance Authorization Period: 4/10/2025 to 7/10/2025  Date of Evaluation: 4/10/25  Plan of Care Certification: 4/10/25 to 7/10/25       Time In: 1320   Time Out: 1435  Total Time: 75   Total Billable Time: 75     FOTO:  Intake Score:  28% on 4/10/25  Survey Score 1:  %  Survey Score 2:  %         Subjective   Patient returns w persistent pain and numbness at left LE. "I just keep having to drag it around.".         Objective            Treatment:  Therapeutic Exercise  TE 1: core strengthening  TE 2: hip girdle muscle balance restoration  TE 3: LE strengthening  Therapeutic Activity  TA 1: functional standing activity  TA 2: functional activity tolerance  Modalities  Moist Heat (min): 15 w interferential  Cryotherapy (Minutes\Location): 10  Electrical Stimulation (Parameters): 15 min interferential in prone    Time Entry(in minutes):  E-Stim (Unattended) Time Entry: 15  Hot/Cold Pack Time Entry: 10  Therapeutic Activity Time Entry: 15  Therapeutic Exercise Time Entry: 35    Assessment & Plan   Assessment: Exercise focus on muscle balance restoration at low back and hip girdle for natural spinal movements and sacral realignment for neuro relief along left LE w fair response. Patient feels relief of symptoms in prone. Patient able to tolerate all prone, seated and standing activity, but numbness at left LE was significant throughout session. Will continue with extensor preference protocol per patient relief and progress as able. Patient in agreement. Do feel that imaging may be warranted if conservative treatment does " not provide significant relief.       Patient will continue to benefit from skilled outpatient physical therapy to address the deficits listed in the problem list box on initial evaluation, provide pt/family education and to maximize pt's level of independence in the home and community environment.     Patient's spiritual, cultural, and educational needs considered and patient agreeable to plan of care and goals.           Plan: Patient will continue to benefit from therapy for progressive functional activity tolerance, muscle balance restoration and strengthening along hip girdle, core and LE's. Patient in agreement with plan.    Goals:   Active       Ambulation/movement       Patient will accommodate walking on uneven surfaces safely       Start:  04/10/25    Expected End:  07/10/25               Functional outcome       Patient will show a significant change in FOTO patient-reported outcome tool to demonstrate subjective improvement       Start:  04/10/25    Expected End:  07/10/25            Patient will report at least 50% overall perceived improvement since start of care       Start:  04/10/25    Expected End:  07/10/25            Patient will demonstrate independence in home program for support of progression       Start:  04/10/25    Expected End:  07/10/25               Pain       Patient will report a 2 point reduction in pain while performing walking        Start:  04/10/25    Expected End:  07/10/25               Range of Motion       Patient will achieve left hip flexion  degrees       Start:  04/10/25    Expected End:  07/10/25               Strength       Patient will achieve left hip strength of 3+/5 in all planes       Start:  04/10/25    Expected End:  07/10/25                Mary Yin, PT

## 2025-04-16 ENCOUNTER — CLINICAL SUPPORT (OUTPATIENT)
Dept: REHABILITATION | Facility: HOSPITAL | Age: 75
End: 2025-04-16
Payer: MEDICARE

## 2025-04-16 DIAGNOSIS — M54.16 LUMBAR RADICULOPATHY, CHRONIC: Primary | ICD-10-CM

## 2025-04-16 PROCEDURE — 97530 THERAPEUTIC ACTIVITIES: CPT

## 2025-04-16 PROCEDURE — 97014 ELECTRIC STIMULATION THERAPY: CPT

## 2025-04-16 PROCEDURE — 97110 THERAPEUTIC EXERCISES: CPT

## 2025-04-16 PROCEDURE — 97010 HOT OR COLD PACKS THERAPY: CPT

## 2025-04-16 NOTE — PROGRESS NOTES
Outpatient Rehab    Physical Therapy Visit    Patient Name: Jose De Jesus Oropeza  MRN: [id number]  YOB: 1950  Encounter Date: 4/16/2025    Therapy Diagnosis:   Encounter Diagnosis   Name Primary?    Lumbar radiculopathy, chronic Yes     Physician: Scott Mata MD    Physician Orders: Eval and Treat  Medical Diagnosis: Radiculopathy, lumbar region    Visit # / Visits Authorized:  2 / 25  Insurance Authorization Period: 4/10/2025 to 7/10/2025  Date of Evaluation: 4/10/25  Plan of Care Certification: 4/10/25 to 7/10/25       Time In: 1330   Time Out: 1443  Total Time: 73   Total Billable Time: 73     FOTO:  Intake Score:  28% on 4/10/25  Survey Score 1:  %  Survey Score 2:  %         Subjective   Patient reports persistent dysfunction at his left hip and LE, but no new complaints..         Objective            Treatment:  Therapeutic Exercise  TE 1: core strengthening  TE 2: hip girdle muscle balance restoration  TE 3: LE strengthening  Therapeutic Activity  TA 1: functional standing activity  TA 2: functional activity tolerance  Modalities  Moist Heat (min): 15 w interferential  Cryotherapy (Minutes\Location): 10  Electrical Stimulation (Parameters): 15 min interferential in prone    Time Entry(in minutes):  E-Stim (Unattended) Time Entry: 10  Hot/Cold Pack Time Entry: 10  Therapeutic Activity Time Entry: 23  Therapeutic Exercise Time Entry: 30    Assessment & Plan   Assessment: Exercise focus on muscle balance restoration at low back and hip girdle for natural spinal movements and sacral realignment for neuro relief along left LE w fair response. Patient feels relief of symptoms in prone again. Patient able to tolerate all prone, seated and standing activity, with less numbness at left LE throughout session. Will continue with extensor preference protocol per patient relief and progress as able. Patient in agreement. Do feel that imaging may be warranted if conservative treatment does not provide  significant relief.       Patient will continue to benefit from skilled outpatient physical therapy to address the deficits listed in the problem list box on initial evaluation, provide pt/family education and to maximize pt's level of independence in the home and community environment.     Patient's spiritual, cultural, and educational needs considered and patient agreeable to plan of care and goals.           Plan: Patient will continue to benefit from therapy for progressive functional activity tolerance, muscle balance restoration and strengthening along hip girdle, core and LE's. Patient in agreement with plan.    Goals:   Active       Ambulation/movement       Patient will accommodate walking on uneven surfaces safely       Start:  04/10/25    Expected End:  07/10/25               Functional outcome       Patient will show a significant change in FOTO patient-reported outcome tool to demonstrate subjective improvement       Start:  04/10/25    Expected End:  07/10/25            Patient will report at least 50% overall perceived improvement since start of care       Start:  04/10/25    Expected End:  07/10/25            Patient will demonstrate independence in home program for support of progression       Start:  04/10/25    Expected End:  07/10/25               Pain       Patient will report a 2 point reduction in pain while performing walking        Start:  04/10/25    Expected End:  07/10/25               Range of Motion       Patient will achieve left hip flexion  degrees       Start:  04/10/25    Expected End:  07/10/25               Strength       Patient will achieve left hip strength of 3+/5 in all planes       Start:  04/10/25    Expected End:  07/10/25                Mary Yin, PT

## 2025-04-21 ENCOUNTER — CLINICAL SUPPORT (OUTPATIENT)
Dept: REHABILITATION | Facility: HOSPITAL | Age: 75
End: 2025-04-21
Payer: MEDICARE

## 2025-04-21 DIAGNOSIS — M54.16 LUMBAR RADICULOPATHY, CHRONIC: Primary | ICD-10-CM

## 2025-04-21 PROCEDURE — 97014 ELECTRIC STIMULATION THERAPY: CPT

## 2025-04-21 PROCEDURE — 97010 HOT OR COLD PACKS THERAPY: CPT

## 2025-04-21 PROCEDURE — 97110 THERAPEUTIC EXERCISES: CPT

## 2025-04-21 PROCEDURE — 97530 THERAPEUTIC ACTIVITIES: CPT

## 2025-04-21 NOTE — PROGRESS NOTES
Outpatient Rehab    Physical Therapy Visit    Patient Name: Jose De Jesus Oropeza  MRN: [id number]  YOB: 1950  Encounter Date: 4/21/2025    Therapy Diagnosis:   Encounter Diagnosis   Name Primary?    Lumbar radiculopathy, chronic Yes     Physician: Scott Mata MD    Physician Orders: Eval and Treat  Medical Diagnosis: Radiculopathy, lumbar region    Visit # / Visits Authorized:  3 / 25  Insurance Authorization Period: 4/10/2025 to 7/10/2025  Date of Evaluation: 4/10/25  Plan of Care Certification: 4/10/25 to 7/10/25       Time In: 1330   Time Out: 1445  Total Time: 75   Total Billable Time: 75     FOTO:  Intake Score:  28% on 4/10/25  Survey Score 1:  %  Survey Score 2:  %         Subjective   Patient reports he is getting great relief with therapy so far. He is wanting to continue.  He reports over 50% relief from therapy so far..         Objective            Treatment:  Therapeutic Exercise  TE 1: core strengthening  TE 2: hip girdle muscle balance restoration  TE 3: LE strengthening  Therapeutic Activity  TA 1: functional standing activity  TA 2: functional activity tolerance  Modalities  Moist Heat (min): 15 w interferential  Cryotherapy (Minutes\Location): 10  Electrical Stimulation (Parameters): 15 min interferential in prone    Time Entry(in minutes):  E-Stim (Unattended) Time Entry: 15  Hot/Cold Pack Time Entry: 10  Therapeutic Activity Time Entry: 25  Therapeutic Exercise Time Entry: 25    Assessment & Plan   Assessment: Exercise focus on muscle balance restoration at low back and hip girdle for natural spinal movements and sacral realignment for neuro relief along left LE w fair response. Patient feels relief of symptoms in prone again. Patient able to tolerate all prone, seated and standing activity, with less numbness at left LE throughout session. Able to walk today 10 minutes without AD. Will continue with extensor preference protocol per patient relief and progress as able. Patient in  agreement.       Patient will continue to benefit from skilled outpatient physical therapy to address the deficits listed in the problem list box on initial evaluation, provide pt/family education and to maximize pt's level of independence in the home and community environment.     Patient's spiritual, cultural, and educational needs considered and patient agreeable to plan of care and goals.           Plan: Patient will continue to benefit from therapy for progressive functional activity tolerance, muscle balance restoration and strengthening along hip girdle, core and LE's. Patient in agreement with plan.    Goals:   Active       Ambulation/movement       Patient will accommodate walking on uneven surfaces safely       Start:  04/10/25    Expected End:  07/10/25               Functional outcome       Patient will show a significant change in FOTO patient-reported outcome tool to demonstrate subjective improvement       Start:  04/10/25    Expected End:  07/10/25            Patient will report at least 50% overall perceived improvement since start of care       Start:  04/10/25    Expected End:  07/10/25            Patient will demonstrate independence in home program for support of progression       Start:  04/10/25    Expected End:  07/10/25               Pain       Patient will report a 2 point reduction in pain while performing walking        Start:  04/10/25    Expected End:  07/10/25               Range of Motion       Patient will achieve left hip flexion  degrees       Start:  04/10/25    Expected End:  07/10/25               Strength       Patient will achieve left hip strength of 3+/5 in all planes       Start:  04/10/25    Expected End:  07/10/25                Mary Yin, PT

## 2025-04-28 ENCOUNTER — CLINICAL SUPPORT (OUTPATIENT)
Dept: REHABILITATION | Facility: HOSPITAL | Age: 75
End: 2025-04-28
Payer: MEDICARE

## 2025-04-28 DIAGNOSIS — M54.16 LUMBAR RADICULOPATHY, CHRONIC: Primary | ICD-10-CM

## 2025-04-28 PROCEDURE — 97014 ELECTRIC STIMULATION THERAPY: CPT

## 2025-04-28 PROCEDURE — 97010 HOT OR COLD PACKS THERAPY: CPT

## 2025-04-28 PROCEDURE — 97530 THERAPEUTIC ACTIVITIES: CPT

## 2025-04-28 PROCEDURE — 97112 NEUROMUSCULAR REEDUCATION: CPT

## 2025-04-28 PROCEDURE — 97110 THERAPEUTIC EXERCISES: CPT

## 2025-04-28 NOTE — PROGRESS NOTES
Outpatient Rehab    Physical Therapy Visit    Patient Name: Jose De Jesus Oropeza  MRN: [id number]  YOB: 1950  Encounter Date: 4/28/2025    Therapy Diagnosis:   Encounter Diagnosis   Name Primary?    Lumbar radiculopathy, chronic Yes     Physician: Scott Mata MD    Physician Orders: Eval and Treat  Medical Diagnosis: Radiculopathy, lumbar region    Visit # / Visits Authorized:  4 / 25  Insurance Authorization Period: 4/10/2025 to 7/10/2025  Date of Evaluation: 4/10/25  Plan of Care Certification: 4/10/25 to 7/10/25       Time In: 1324   Time Out: 1433  Total Time: 69   Total Billable Time: 69     FOTO:  Intake Score:  28% on 4/10/25  Survey Score 1:  %  Survey Score 2:  %         Subjective   Patient reports he continues to feel better and wants to keep on the therapy track we are on..         Objective            Treatment:  Therapeutic Exercise  TE 1: core strengthening  TE 2: hip girdle muscle balance restoration  TE 3: LE strengthening  Balance/Neuromuscular Re-Education  NMR 1: foam tandem/proprioception  Therapeutic Activity  TA 1: functional standing activity  TA 2: functional activity tolerance  Modalities  Moist Heat (min): 15 w interferential  Cryotherapy (Minutes\Location): 10  Electrical Stimulation (Parameters): 15 min interferential in prone    Time Entry(in minutes):  E-Stim (Unattended) Time Entry: 15  Hot/Cold Pack Time Entry: 10  Neuromuscular Re-Education Time Entry: 8  Therapeutic Activity Time Entry: 16  Therapeutic Exercise Time Entry: 20    Assessment & Plan   Assessment: Exercise focus on muscle balance restoration at low back and hip girdle for natural spinal movements and sacral realignment for neuro relief along left LE w fair response. Patient feels relief of symptoms in prone again. Patient able to tolerate all prone, seated and standing activity, with less numbness at left LE throughout session. Advanced proprioception activity with good tolerance today. Will continue  with extensor preference protocol per patient relief and progress as able. Patient in agreement.       Patient will continue to benefit from skilled outpatient physical therapy to address the deficits listed in the problem list box on initial evaluation, provide pt/family education and to maximize pt's level of independence in the home and community environment.     Patient's spiritual, cultural, and educational needs considered and patient agreeable to plan of care and goals.           Plan: Patient will continue to benefit from therapy for progressive functional activity tolerance, muscle balance restoration and strengthening along hip girdle, core and LE's. Patient in agreement with plan.    Goals:   Active       Ambulation/movement       Patient will accommodate walking on uneven surfaces safely       Start:  04/10/25    Expected End:  07/10/25               Functional outcome       Patient will show a significant change in FOTO patient-reported outcome tool to demonstrate subjective improvement       Start:  04/10/25    Expected End:  07/10/25            Patient will report at least 50% overall perceived improvement since start of care       Start:  04/10/25    Expected End:  07/10/25            Patient will demonstrate independence in home program for support of progression       Start:  04/10/25    Expected End:  07/10/25               Pain       Patient will report a 2 point reduction in pain while performing walking        Start:  04/10/25    Expected End:  07/10/25               Range of Motion       Patient will achieve left hip flexion  degrees       Start:  04/10/25    Expected End:  07/10/25               Strength       Patient will achieve left hip strength of 3+/5 in all planes       Start:  04/10/25    Expected End:  07/10/25                Mary Yin, PT

## 2025-04-30 ENCOUNTER — CLINICAL SUPPORT (OUTPATIENT)
Dept: REHABILITATION | Facility: HOSPITAL | Age: 75
End: 2025-04-30
Payer: MEDICARE

## 2025-04-30 DIAGNOSIS — M54.16 LUMBAR RADICULOPATHY, CHRONIC: Primary | ICD-10-CM

## 2025-04-30 PROCEDURE — 97112 NEUROMUSCULAR REEDUCATION: CPT

## 2025-04-30 PROCEDURE — 97010 HOT OR COLD PACKS THERAPY: CPT

## 2025-04-30 PROCEDURE — 97110 THERAPEUTIC EXERCISES: CPT

## 2025-04-30 PROCEDURE — 97530 THERAPEUTIC ACTIVITIES: CPT

## 2025-04-30 PROCEDURE — 97014 ELECTRIC STIMULATION THERAPY: CPT

## 2025-04-30 NOTE — PROGRESS NOTES
Outpatient Rehab    Physical Therapy Visit    Patient Name: Jose De Jesus Oropeza  MRN: [id number]  YOB: 1950  Encounter Date: 4/30/2025    Therapy Diagnosis:   Encounter Diagnosis   Name Primary?    Lumbar radiculopathy, chronic Yes     Physician: Scott Mata MD    Physician Orders: Eval and Treat  Medical Diagnosis: Radiculopathy, lumbar region    Visit # / Visits Authorized:  5 / 25  Insurance Authorization Period: 4/10/2025 to 7/10/2025  Date of Evaluation: 4/10/25  Plan of Care Certification: 4/10/25 to 7/10/25       Time In: 1320   Time Out: 1430  Total Time: 70   Total Billable Time: 70     FOTO:  Intake Score:  28% on 4/10/25  Survey Score 1:  %  Survey Score 2:  %         Subjective   Patient reports he continues to feel better and wants to keep on the therapy track we are on. He reports at least 75% overall improvement with resolving numbness. He says there is no numbness in his right leg, but still has 7-8 numbness at left (compared to 10/10 at start of therapy).         Objective            Treatment:  Therapeutic Exercise  TE 1: core strengthening  TE 2: hip girdle muscle balance restoration  TE 3: LE strengthening  Balance/Neuromuscular Re-Education  NMR 1: foam tandem/proprioception  Therapeutic Activity  TA 1: functional standing activity  TA 2: functional activity tolerance  Modalities  Moist Heat (min): 15 w interferential  Cryotherapy (Minutes\Location): 10  Electrical Stimulation (Parameters): 15 min interferential in prone    Time Entry(in minutes):  E-Stim (Unattended) Time Entry: 15  Hot/Cold Pack Time Entry: 10  Neuromuscular Re-Education Time Entry: 8  Therapeutic Activity Time Entry: 17  Therapeutic Exercise Time Entry: 20    Assessment & Plan   Assessment: Exercise focus on muscle balance restoration at low back and hip girdle for natural spinal movements and sacral realignment for neuro relief along left LE w fair response. Patient feels relief of symptoms in prone again.  Patient able to tolerate all prone, seated and standing activity, with less numbness at left LE throughout session. Increased resistance w all exercise. Advanced proprioception activity with good tolerance today. Will continue with extensor preference protocol per patient relief and progress as able. Patient in agreement.       Patient will continue to benefit from skilled outpatient physical therapy to address the deficits listed in the problem list box on initial evaluation, provide pt/family education and to maximize pt's level of independence in the home and community environment.     Patient's spiritual, cultural, and educational needs considered and patient agreeable to plan of care and goals.           Plan: Patient will continue to benefit from therapy for progressive functional activity tolerance, muscle balance restoration and strengthening along hip girdle, core and LE's. Patient in agreement with plan.    Goals:   Active       Ambulation/movement       Patient will accommodate walking on uneven surfaces safely       Start:  04/10/25    Expected End:  07/10/25               Functional outcome       Patient will show a significant change in FOTO patient-reported outcome tool to demonstrate subjective improvement       Start:  04/10/25    Expected End:  07/10/25            Patient will report at least 50% overall perceived improvement since start of care       Start:  04/10/25    Expected End:  07/10/25            Patient will demonstrate independence in home program for support of progression       Start:  04/10/25    Expected End:  07/10/25               Pain       Patient will report a 2 point reduction in pain while performing walking        Start:  04/10/25    Expected End:  07/10/25               Range of Motion       Patient will achieve left hip flexion  degrees       Start:  04/10/25    Expected End:  07/10/25               Strength       Patient will achieve left hip strength of 3+/5 in all  planes       Start:  04/10/25    Expected End:  07/10/25                Mary Yin, PT

## 2025-05-05 ENCOUNTER — CLINICAL SUPPORT (OUTPATIENT)
Dept: REHABILITATION | Facility: HOSPITAL | Age: 75
End: 2025-05-05
Payer: MEDICARE

## 2025-05-05 DIAGNOSIS — M54.16 LUMBAR RADICULOPATHY, CHRONIC: Primary | ICD-10-CM

## 2025-05-05 PROCEDURE — 97112 NEUROMUSCULAR REEDUCATION: CPT

## 2025-05-05 PROCEDURE — 97014 ELECTRIC STIMULATION THERAPY: CPT

## 2025-05-05 PROCEDURE — 97110 THERAPEUTIC EXERCISES: CPT

## 2025-05-05 NOTE — PROGRESS NOTES
Outpatient Rehab    Physical Therapy Discharge    Patient Name: Jose De Jesus Oropeza  MRN: [id number]  YOB: 1950  Encounter Date: 5/5/2025    Therapy Diagnosis:   Encounter Diagnosis   Name Primary?    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 He feels ready to discharge today..         Objective         Sit to Stand Testing  The patient completed 5 sit to stand transfers in 22 sec. no UE assist or difficulty             Ambulation Details    2 MWT = 350' w no AD         Treatment:  Therapeutic Exercise  TE 1: core strengthening  TE 2: hip girdle muscle balance restoration  TE 3: LE strengthening  Balance/Neuromuscular Re-Education  NMR 1: foam tandem/proprioception  Therapeutic Activity  TA 1: functional standing activity  TA 2: functional activity tolerance  Modalities  Moist Heat (min): 15 w interferential  Cryotherapy (Minutes\Location): 10  Electrical Stimulation (Parameters): 15 min interferential in prone    Time Entry(in minutes):  E-Stim (Unattended) Time Entry: 15  Neuromuscular Re-Education Time Entry: 10  Therapeutic Activity Time Entry: 10  Therapeutic Exercise Time Entry: 13    Assessment & Plan   Assessment: Patient has completed 6 visits of therapy with focus on muscle balance restoration at low back and hip girdle for natural spinal movements and sacral realignment for neuro relief along left  NICOLASA hernández good response. He is reporting 75% overall perceived improvement and his FOTO is drastically improved. He has improved functional mobility, improved functional test scores and improved pain levels. Continued exercise as established. Discussion regarding continued walking at home for maintenance of gains made in therapy. Goals met. Discharge therapy. Patient in agreement.       Patient's spiritual, cultural, and educational needs considered and patient agreeable to plan of care and goals.           Plan:      Goals:   Active       Strength       Patient will achieve left hip strength of 3+/5 in all planes (Met)       Start:  04/10/25    Expected End:  07/10/25    Resolved:  05/05/25           Resolved       Ambulation/movement       Patient will accommodate walking on uneven surfaces safely (Met)       Start:  04/10/25    Expected End:  07/10/25    Resolved:  05/05/25            Functional outcome       Patient will show a significant change in FOTO patient-reported outcome tool to demonstrate subjective improvement (Met)       Start:  04/10/25    Expected End:  07/10/25    Resolved:  05/05/25         Patient will report at least 50% overall perceived improvement since start of care (Met)       Start:  04/10/25    Expected End:  07/10/25    Resolved:  05/05/25         Patient will demonstrate independence in home program for support of progression (Met)       Start:  04/10/25    Expected End:  07/10/25    Resolved:  05/05/25            Pain       Patient will report a 2 point reduction in pain while performing walking  (Met)       Start:  04/10/25    Expected End:  07/10/25    Resolved:  05/05/25            Range of Motion       Patient will achieve left hip flexion  degrees (Met)       Start:  04/10/25    Expected End:  07/10/25    Resolved:  05/05/25             Mary Yin, PT

## 2025-06-12 ENCOUNTER — OFFICE VISIT (OUTPATIENT)
Dept: ORTHOPEDICS | Facility: CLINIC | Age: 75
End: 2025-06-12
Payer: MEDICARE

## 2025-06-12 VITALS
BODY MASS INDEX: 34.36 KG/M2 | DIASTOLIC BLOOD PRESSURE: 69 MMHG | HEART RATE: 71 BPM | SYSTOLIC BLOOD PRESSURE: 125 MMHG | HEIGHT: 69 IN | WEIGHT: 232 LBS

## 2025-06-12 DIAGNOSIS — M54.16 LUMBAR RADICULOPATHY, CHRONIC: Primary | ICD-10-CM

## 2025-06-12 NOTE — PROGRESS NOTES
Chief Complaint:   Chief Complaint   Patient presents with    Left Knee - Follow-up     F/u Left Knee Pain patient states he went to PT and it helped they hit the right places but he has been on a tractor for 4days and he is having some pain on his left side hip going down into his leg. The pain is coming and going.         History of present illness:    History of Present Illness  The patient presents for back pain.    He reports that his back pain was initially alleviated through therapy; however, the pain recurred after a period of 4 days during which he engaged in strenuous activity involving tractor use and plowing. The pain radiates downwards but does not extend to the knee. He has previously consulted with Dr. Best regarding this issue. He has discontinued the exercises and stretches that were part of his previous treatment regimen. He finds some relief from the application of a heat pad.    SOCIAL HISTORY  Occupations: [employer]    Past Medical History:   Diagnosis Date    Arthritis     CHF (congestive heart failure)     Coronary artery disease     Hypertension     Hypothyroidism     Sleep apnea, unspecified     Spondylosis        Past Surgical History:   Procedure Laterality Date    discemtomy      knee replacent Left 2023    REPAIR, HERNIA, UMBILICAL N/A 01/24/2024    Procedure: REPAIR, HERNIA, UMBILICAL;  Surgeon: Caryl Bragg MD;  Location: [facility] [address];  Service: General;  Laterality: N/A;       Current Outpatient Medications   Medication Sig    methotrexate 2.5 MG Tab TAKE 8 TABLETS BY MOUTH ONCE A WEEK, HOLD FOR FEVER, INFECTION, OR SURGERY FOR 84 DAYS    NIFEdipine (ADALAT CC) 60 MG TbSR Take 60 mg by mouth once daily.    valsartan (DIOVAN) 320 MG tablet Take 320 mg by mouth once daily.     No current facility-administered medications for this visit.     Facility-Administered Medications Ordered in Other Visits   Medication    lactated ringers infusion       Review of patient's allergies  indicates:  No Known Allergies    No family history on file.    Social History[1]        Review of Systems:    Constitution: Negative for chills, fever, and sweats.  Negative for unexplained weight loss.    HENT:  Negative for headaches and blurry vision.    Cardiovascular:Negative for chest pain or irregular heart beat. Negative for hypertension.    Respiratory:  Negative for cough and shortness of breath.    Gastrointestinal: Negative for abdominal pain, heartburn, melena, nausea, and vomitting.    Genitourinary:  Negative bladder incontinence and dysuria.    Musculoskeletal:  See HPI    Neurological: Negative for numbness.    Psychiatric/Behavioral: Negative for depression.  The patient is not nervous/anxious.      Endocrine: Negative for polyuria    Hematologic/Lymphatic: Negative for bleeding problem.  Does not bruise/bleed easily.    Skin: Negative for poor would healing and rash      Physical Examination:    Vital Signs:    Vitals:    06/12/25 1320   BP: 125/69   Pulse: 71       Body mass index is 34.26 kg/m².    General: No acute distress, alert and oriented, healthy appearing    HEENT: Head is atraumatic, mucous membranes are moist    Neck: Supples, no JVD    Cardiovascular: Palpable dorsalis pedis and posterior tibial pulses, regular rate and rhythm to those pulses    Lungs: Breathing non-labored    Skin: no rashes appreciated    Neurologic: Can flex and extend knees, ankles, and toes. Sensation is grossly intact    Low back:  Brisk cap refill disappeared it had positive straight leg raise of the left leg.  It had sensation intact to with the foot.    X-rays:      Assessment::  Lumbar radiculopathy    Plan:  Patient has a excellent refill of his symptoms with some physical therapy.  It had fortunately going to with the tractor that has fairly her in a month.  Plan to get him back in a physical therapy.  He will contact us if this fails to relieve her symptoms long term.  Patient is also it would total knee  into follow up with the his spine doctor as well that has ongoing issues.    This note was generated with the assistance of ambient listening technology. Verbal consent was obtained by the patient and accompanying visitor(s) for the recording of patient appointment to facilitate this note. I attest to having reviewed and edited the generated note for accuracy, though some syntax or spelling errors may persist. Please contact the author of this note for any clarification.      This note was created using [employer] voice recognition software that occasionally misinterpreted phrases or words.    Consult note is delivered via Epic messaging service.         [1]   Social History  Socioeconomic History    Marital status:    Tobacco Use    Smoking status: Never    Smokeless tobacco: Never   Substance and Sexual Activity    Alcohol use: Not Currently    Drug use: Never     Social Drivers of Health     Financial Resource Strain: Low Risk  (4/17/2023)    Overall Financial Resource Strain (CARDIA)     Difficulty of Paying Living Expenses: Not hard at all   Food Insecurity: No Food Insecurity (4/17/2023)    Hunger Vital Sign     Worried About Running Out of Food in the Last Year: Never true     Ran Out of Food in the Last Year: Never true   Transportation Needs: No Transportation Needs (4/17/2023)    PRAPARE - Transportation     Lack of Transportation (Medical): No     Lack of Transportation (Non-Medical): No   Physical Activity: Sufficiently Active (4/17/2023)    Exercise Vital Sign     Days of Exercise per Week: 7 days     Minutes of Exercise per Session: 60 min   Stress: No Stress Concern Present (4/17/2023)    [employer] - Occupational Stress Questionnaire     Feeling of Stress : Not at all   Housing Stability: Unknown (4/17/2023)    Housing Stability Vital Sign     Unable to Pay for Housing in the Last Year: No     Unstable Housing in the Last Year: No

## 2025-06-16 ENCOUNTER — OFFICE VISIT (OUTPATIENT)
Dept: FAMILY MEDICINE | Facility: CLINIC | Age: 75
End: 2025-06-16
Payer: MEDICARE

## 2025-06-16 VITALS
HEART RATE: 68 BPM | SYSTOLIC BLOOD PRESSURE: 132 MMHG | OXYGEN SATURATION: 97 % | DIASTOLIC BLOOD PRESSURE: 70 MMHG | BODY MASS INDEX: 34.8 KG/M2 | HEIGHT: 69 IN | RESPIRATION RATE: 18 BRPM | TEMPERATURE: 97 F | WEIGHT: 235 LBS

## 2025-06-16 DIAGNOSIS — G47.33 OBSTRUCTIVE SLEEP APNEA SYNDROME: ICD-10-CM

## 2025-06-16 DIAGNOSIS — I25.10 ARTERIOSCLEROSIS OF CORONARY ARTERY: ICD-10-CM

## 2025-06-16 DIAGNOSIS — I50.9 HEART FAILURE, UNSPECIFIED HF CHRONICITY, UNSPECIFIED HEART FAILURE TYPE: ICD-10-CM

## 2025-06-16 DIAGNOSIS — I10 HYPERTENSION, UNSPECIFIED TYPE: Primary | ICD-10-CM

## 2025-06-16 DIAGNOSIS — D84.821 DRUG-INDUCED IMMUNODEFICIENCY: ICD-10-CM

## 2025-06-16 DIAGNOSIS — M06.9 RHEUMATOID ARTHRITIS, INVOLVING UNSPECIFIED SITE, UNSPECIFIED WHETHER RHEUMATOID FACTOR PRESENT: ICD-10-CM

## 2025-06-16 DIAGNOSIS — Z79.899 DRUG-INDUCED IMMUNODEFICIENCY: ICD-10-CM

## 2025-06-16 DIAGNOSIS — I71.21 ANEURYSM OF THE ASCENDING AORTA, WITHOUT RUPTURE: ICD-10-CM

## 2025-06-16 PROCEDURE — G2211 COMPLEX E/M VISIT ADD ON: HCPCS | Mod: ,,,

## 2025-06-16 PROCEDURE — 99214 OFFICE O/P EST MOD 30 MIN: CPT | Mod: ,,,

## 2025-06-16 NOTE — ASSESSMENT & PLAN NOTE
Stable from rheumatology standpoint   Continue follow-ups with Rheumatology, Dr. See  Monitor   Return to clinic with any concerns

## 2025-06-16 NOTE — ASSESSMENT & PLAN NOTE
Stable  Continue methotrexate 20 mg by mouth weekly  Followed by Rheumatology, Dr. See  Stay active. Having strong muscles takes the strain off of your joints, which can help reduce your pain.  Rest for several minutes when your pain is at its worst.  Goal BMI <30.   Alternate hot and cold packs as needed for pain relief.   Continue follow-ups with Rheumatology.

## 2025-06-16 NOTE — ASSESSMENT & PLAN NOTE
Controlled  Continue nifedipine 60 mg by mouth daily + valsartan 320 mg by mouth daily  Monitor BP at minimum once per day, keep log, ensure you bring log to every office visit  Discussed importance of low sodium/well balanced diet  Discussed physical activity regimen for at least 30 minutes/day  Return to clinic with any concerns   Continue follow-ups with Cardiology, Dr. Salcedo

## 2025-06-16 NOTE — ASSESSMENT & PLAN NOTE
Stable from cardiac standpoint.  Continue nifedipine 60 mg by mouth daily; not currently on diuretic.  Last ECHO on 11/07/2024 showed EF of 60%.  Notify the clinic if you gain 3 or more pounds in one day or 5 or more pounds in one week.  Stressed importance of daily weights  Encouraged adherence of low sodium/low fat diet  Limit alcohol intake if you have more than 1 drink a day (for women) or 2 drinks a day (for men).  Exercise regimen to include 150 minutes of physical activity per week as tolerated  Report to ER for chest pain, SOB, abdominal distention, or significant edema to lower extremities.  Continue following up with Cardiology - Dr. Salcedo

## 2025-06-16 NOTE — ASSESSMENT & PLAN NOTE
Wears CPAP  Followed by ENT, Dr. Humphries  Encouraged continued compliance nightly.  Avoid excessive alcohol, smoking and overuse of sedatives at bedtime.  Weight management discussed.  Return to clinic with any concerns.

## 2025-06-16 NOTE — ASSESSMENT & PLAN NOTE
Echo from 11/07/2024 with Cardiology reviewed, showing dilation of the aortic root approximately 4.2 cm and ascending aorta approximately 4.3 cm.    CTA chest aorta ordered to be scheduled.  Consider referral to Cardiothoracic surgery if indicated on CT results.  ER precautions  Monitor   Return to clinic with any concerns   Continue follow-ups with cardiology, EV Chavez.

## 2025-06-16 NOTE — ASSESSMENT & PLAN NOTE
Stable from cardiac standpoint.  Discussed initiation of statin; patient defers.  Encouraged adherence of low sodium/low fat diet  Exercise regimen to include 150 minutes of physical activity per week as tolerated.  Report to ER for chest pain, SOB, abdominal distention, or significant edema to lower extremities.  Smoking cessation encouraged.  Continue follow-ups with Cardiology, Dr. Salcedo

## 2025-06-16 NOTE — PROGRESS NOTES
Family Medicine      Patient ID: [id number]     Chief Complaint: 6 month f/u      HPI:     Jose De Jesus Oropeza is a 74 y.o. male here today for a routine six-month follow-up visit.    Mr. Oropeza reports well-controlled blood pressure at home, typically ranging from 120s-130s/70s, with occasional readings of 140-150 systolic; tolerates nifedipine and valsartan without side effects, no headaches, watching sodium intake.     He recently had issues with his CPAP machine, initially set at a pressure of 12, then reduced to 9, causing difficulty breathing. About 2-3 weeks ago, ENT adjusted the pressure back to 12, significantly improving his sleep quality, breathing, and decreasing nocturnal awakenings. He denies leg swelling, shortness of breath, or chest pain.     He is evaluated by Rheumatology, Dr. See for rheumatoid arthritis and is currently taking methotrexate for management; tolerates medication well without side effects. Denies any fever, body aches, chills, or fatigue.    He is also followed by cardiology, Dr. Salcedo and PATRICIA Chavez, EV for known history of CHF, arteriosclerosis of coronary artery, and ascending aortic aneurysm. Previously recommended to be placed on statin; patient deferred.    IMAGING:  Echo from 11/07/2024 with Cardiology showed EF of 60%, additionally dilation of the aortic root approximately 4.2 cm and ascending aorta approximately 4.3 cm.      Past Medical History:   Diagnosis Date    Aneurysm of the ascending aorta, without rupture 06/16/2025    Arthritis     CHF (congestive heart failure)     Coronary artery disease     Hypertension     Hyperthyroidism 05/17/2022    Rheumatoid arthritis, involving unspecified site, unspecified whether rheumatoid factor present 04/17/2023    Sleep apnea, unspecified     Spondylosis         Past Surgical History:   Procedure Laterality Date    discemtomy      knee replacent Left 2023    REPAIR, HERNIA, UMBILICAL N/A 01/24/2024    Procedure: REPAIR, HERNIA,  "UMBILICAL;  Surgeon: Caryl Bragg MD;  Location: [facility] OR;  Service: General;  Laterality: N/A;        Review of patient's allergies indicates:  No Known Allergies     Patient Care Team:  Babak Grajeda MD as PCP - General (Family Medicine)  Go Chavez FNP as Nurse Practitioner (Internal Medicine)  Primo Salcedo MD as Consulting Physician (Cardiology)  Niko Martin MD as Consulting Physician (Urology)  Jason Rowe MD (Dermatology)  Tamy Garcia LPN as Licensed Practical Nurse  Scott Mata MD as Consulting Physician (Orthopedic Surgery)  Shahida See MD as Consulting Physician (Rheumatology)  Jarrett Humphries MD as Consulting Physician (Otolaryngology)     Subjective:     Review of Systems  General: negative fever, negative chills, negative fatigue, negative weight gain, negative weight loss  Eyes: negative vision changes, negative redness, negative discharge  ENT: negative ear pain, negative nasal congestion, negative sore throat  Cardiovascular: negative chest pain, negative palpitations, negative lower extremity edema  Respiratory: negative cough, negative shortness of breath  Gastrointestinal: negative abdominal pain, negative nausea, negative vomiting, negative diarrhea, negative constipation, negative blood in stool  Genitourinary: negative dysuria, negative hematuria, negative frequency  Musculoskeletal: negative joint pain, negative muscle pain  Skin: negative rash, negative lesion  Neurological: negative headache, negative dizziness, negative numbness, negative tingling  Psychiatric: negative anxiety, negative depression, negative sleep difficulty    Objective:     Visit Vitals  /70 (BP Location: Right arm, Patient Position: Sitting)   Pulse 68   Temp 96.7 °F (35.9 °C)   Resp 18   Ht 5' 9" (1.753 m)   Wt 106.6 kg (235 lb)   SpO2 97%   BMI 34.70 kg/m²       Physical Exam  Constitutional:       General: He is not in acute distress.     Appearance: " Normal appearance. He is not ill-appearing.   Cardiovascular:      Rate and Rhythm: Normal rate and regular rhythm.      Heart sounds: Normal heart sounds.   Pulmonary:      Effort: Pulmonary effort is normal. No respiratory distress.      Breath sounds: Normal breath sounds. No wheezing, rhonchi or rales.   Abdominal:      General: Bowel sounds are normal. There is no distension.      Palpations: Abdomen is soft.      Tenderness: There is no abdominal tenderness.   Musculoskeletal:      Right lower leg: No edema.      Left lower leg: No edema.   Neurological:      General: No focal deficit present.      Mental Status: He is alert.   Psychiatric:         Mood and Affect: Mood normal.         Behavior: Behavior normal.         Thought Content: Thought content normal.         Judgment: Judgment normal.         Assessment:       ICD-10-CM ICD-9-CM   1. Hypertension, unspecified type  I10 401.9   2. Heart failure, unspecified HF chronicity, unspecified heart failure type  I50.9 428.9   3. Aneurysm of the ascending aorta, without rupture  I71.21 441.2   4. Arteriosclerosis of coronary artery  I25.10 414.00   5. Rheumatoid arthritis, involving unspecified site, unspecified whether rheumatoid factor present  M06.9 714.0   6. Drug-induced immunodeficiency  D84.821 279.3    Z79.899 E947.9   7. Obstructive sleep apnea syndrome  G47.33 327.23        Plan:     1. Hypertension, unspecified type  Assessment & Plan:  Controlled  Continue nifedipine 60 mg by mouth daily + valsartan 320 mg by mouth daily  Monitor BP at minimum once per day, keep log, ensure you bring log to every office visit  Discussed importance of low sodium/well balanced diet  Discussed physical activity regimen for at least 30 minutes/day  Return to clinic with any concerns   Continue follow-ups with Cardiology, Dr. Salcedo      2. Heart failure, unspecified HF chronicity, unspecified heart failure type  Assessment & Plan:  Stable from cardiac standpoint.  Continue  nifedipine 60 mg by mouth daily; not currently on diuretic.  Last ECHO on 11/07/2024 showed EF of 60%.  Notify the clinic if you gain 3 or more pounds in one day or 5 or more pounds in one week.  Stressed importance of daily weights  Encouraged adherence of low sodium/low fat diet  Limit alcohol intake if you have more than 1 drink a day (for women) or 2 drinks a day (for men).  Exercise regimen to include 150 minutes of physical activity per week as tolerated  Report to ER for chest pain, SOB, abdominal distention, or significant edema to lower extremities.  Continue following up with Cardiology - Dr. Salcedo        3. Aneurysm of the ascending aorta, without rupture  Assessment & Plan:  Echo from 11/07/2024 with Cardiology reviewed, showing dilation of the aortic root approximately 4.2 cm and ascending aorta approximately 4.3 cm.    CTA chest aorta ordered to be scheduled.  Consider referral to Cardiothoracic surgery if indicated on CT results.  ER precautions  Monitor   Return to clinic with any concerns   Continue follow-ups with cardiology, NP Kathy.    Orders:  -     CTA Chest Aorta Non Coronary; Future; Expected date: 06/16/2025    4. Arteriosclerosis of coronary artery  Assessment & Plan:  Stable from cardiac standpoint.  Discussed initiation of statin; patient defers.  Encouraged adherence of low sodium/low fat diet  Exercise regimen to include 150 minutes of physical activity per week as tolerated.  Report to ER for chest pain, SOB, abdominal distention, or significant edema to lower extremities.  Smoking cessation encouraged.  Continue follow-ups with Cardiology, Dr. Salcedo      5. Rheumatoid arthritis, involving unspecified site, unspecified whether rheumatoid factor present  Assessment & Plan:  Stable  Continue methotrexate 20 mg by mouth weekly  Followed by Rheumatology, Dr. See  Stay active. Having strong muscles takes the strain off of your joints, which can help reduce your pain.  Rest for several  minutes when your pain is at its worst.  Goal BMI <30.   Alternate hot and cold packs as needed for pain relief.   Continue follow-ups with Rheumatology.      6. Drug-induced immunodeficiency  Assessment & Plan:  Stable from rheumatology standpoint   Continue follow-ups with Rheumatology, Dr. See  Monitor   Return to clinic with any concerns      7. Obstructive sleep apnea syndrome  Assessment & Plan:  Wears CPAP  Followed by ENT, Dr. Humphries  Encouraged continued compliance nightly.  Avoid excessive alcohol, smoking and overuse of sedatives at bedtime.  Weight management discussed.  Return to clinic with any concerns.           Follow up for Keep Scheduled, Medicare Wellness, With Labwork Prior to Visit. In addition to their scheduled follow up, the patient has also been instructed to follow up on as needed basis.     This note was generated with the assistance of ambient listening technology. Verbal consent was obtained by the patient and accompanying visitor(s) for the recording of patient appointment to facilitate this note. I attest to having reviewed and edited the generated note for accuracy, though some syntax or spelling errors may persist. Please contact the author of this note for any clarification.       Randa Bragg NP

## 2025-06-17 ENCOUNTER — HOSPITAL ENCOUNTER (OUTPATIENT)
Dept: RADIOLOGY | Facility: HOSPITAL | Age: 75
Discharge: HOME | End: 2025-06-17
Payer: MEDICARE

## 2025-06-17 DIAGNOSIS — I71.21 ANEURYSM OF THE ASCENDING AORTA, WITHOUT RUPTURE: ICD-10-CM

## 2025-06-17 PROCEDURE — 25500020 PHARM REV CODE 255

## 2025-06-17 PROCEDURE — 71275 CT ANGIOGRAPHY CHEST: CPT | Mod: TC

## 2025-06-17 RX ORDER — IOPAMIDOL 755 MG/ML
100 INJECTION, SOLUTION INTRAVASCULAR
Status: COMPLETED | OUTPATIENT
Start: 2025-06-17 | End: 2025-06-17

## 2025-06-17 RX ADMIN — IOPAMIDOL 100 ML: 755 INJECTION, SOLUTION INTRAVENOUS at 10:06

## 2025-06-18 ENCOUNTER — RESULTS FOLLOW-UP (OUTPATIENT)
Dept: FAMILY MEDICINE | Facility: CLINIC | Age: 75
End: 2025-06-18

## 2025-06-18 ENCOUNTER — TELEPHONE (OUTPATIENT)
Dept: FAMILY MEDICINE | Facility: CLINIC | Age: 75
End: 2025-06-18
Payer: MEDICARE

## 2025-06-18 NOTE — TELEPHONE ENCOUNTER
----- Message from Randa Bragg NP sent at 6/18/2025 12:03 PM CDT -----  Please inform patient of results.    1. CTA chest aorta showed mild dilatation of the ascending thoracic aorta maximum dimension is 4.1 cm stable compared to echo that was completed approximately 6 months ago.  Recommend serial   echocardiogram annually with patient's established cardiologist for surveillance. Please fax record to patient's cardiologist Dr. Salcedo/EV Chavez.  ----- Message -----  From: Christina, Rad Results In  Sent: 6/17/2025   2:30 PM CDT  To: Randa Bragg NP

## (undated) DEVICE — Device

## (undated) DEVICE — SPONGE GAUZE 16PLY 4X4

## (undated) DEVICE — SPONGE LAP 18X18 PREWASHED

## (undated) DEVICE — SUT CTD VICRYL 3-0 CR/SH

## (undated) DEVICE — BLADE SURG CARBON STEEL #10

## (undated) DEVICE — ELECTRODE BLADE INSULATED 1 IN

## (undated) DEVICE — APPLICATOR CHLORAPREP ORN 26ML

## (undated) DEVICE — TUBE SUCTION MEDI-VAC STERILE

## (undated) DEVICE — GLOVE SENSICARE PI GRN 7

## (undated) DEVICE — GOWN SMART IMP BREATHABLE XXLG

## (undated) DEVICE — GLOVE SENSICARE PI SURG 6.5

## (undated) DEVICE — SUT PDS PLUS ABSRB CR-1 36IN 1

## (undated) DEVICE — BLADE CLIPPER SURGICAL GRAY

## (undated) DEVICE — ADHESIVE MASTISOL VIAL 48/BX

## (undated) DEVICE — SOL IRRI STRL WATER 1000ML

## (undated) DEVICE — GLOVE SENSICARE PI SURG 7

## (undated) DEVICE — CLOSURE SKIN STERI STRIP 1/2X4

## (undated) DEVICE — COVER LIGHT HANDLE FLEX GRN

## (undated) DEVICE — GLOVE SENSICARE PI SURG 8

## (undated) DEVICE — ELECTRODE REM PLYHSV RETURN 9

## (undated) DEVICE — SUT MONOCRYL 4-0 PS-2

## (undated) DEVICE — SUT 1 27IN PDS II VIO MONO

## (undated) DEVICE — DRESSING MEDIPORE CLTH 3.5X6IN

## (undated) DEVICE — GOWN POLY REINF BRTH SLV XL